# Patient Record
Sex: MALE | Race: WHITE | NOT HISPANIC OR LATINO | Employment: FULL TIME | URBAN - METROPOLITAN AREA
[De-identification: names, ages, dates, MRNs, and addresses within clinical notes are randomized per-mention and may not be internally consistent; named-entity substitution may affect disease eponyms.]

---

## 2017-06-30 ENCOUNTER — HOSPITAL ENCOUNTER (OUTPATIENT)
Dept: RADIOLOGY | Facility: HOSPITAL | Age: 62
Discharge: HOME | End: 2017-06-30
Attending: FAMILY MEDICINE
Payer: COMMERCIAL

## 2017-06-30 ENCOUNTER — TRANSCRIBE ORDERS (OUTPATIENT)
Dept: ADMINISTRATIVE | Facility: HOSPITAL | Age: 62
End: 2017-06-30

## 2017-06-30 DIAGNOSIS — M54.2 CERVICALGIA: ICD-10-CM

## 2017-06-30 DIAGNOSIS — R55 SYNCOPE AND COLLAPSE: ICD-10-CM

## 2017-06-30 DIAGNOSIS — M54.2 CERVICALGIA: Primary | ICD-10-CM

## 2017-06-30 PROCEDURE — 72050 X-RAY EXAM NECK SPINE 4/5VWS: CPT

## 2017-08-12 ENCOUNTER — HOSPITAL ENCOUNTER (OUTPATIENT)
Dept: RADIOLOGY | Facility: HOSPITAL | Age: 62
Discharge: HOME | End: 2017-08-12
Payer: COMMERCIAL

## 2017-08-12 ENCOUNTER — TRANSCRIBE ORDERS (OUTPATIENT)
Dept: ADMINISTRATIVE | Facility: HOSPITAL | Age: 62
End: 2017-08-12

## 2017-08-12 DIAGNOSIS — T15.90XA EYE FOREIGN BODIES, UNSPECIFIED LATERALITY, INITIAL ENCOUNTER: ICD-10-CM

## 2017-08-12 DIAGNOSIS — T15.90XA EYE FOREIGN BODIES, UNSPECIFIED LATERALITY, INITIAL ENCOUNTER: Primary | ICD-10-CM

## 2017-08-12 PROCEDURE — 70030 X-RAY EYE FOR FOREIGN BODY: CPT

## 2017-09-21 ENCOUNTER — TRANSCRIBE ORDERS (OUTPATIENT)
Dept: ADMINISTRATIVE | Facility: HOSPITAL | Age: 62
End: 2017-09-21

## 2017-09-21 DIAGNOSIS — I77.1 STRICTURE OF ARTERY (HCC): Primary | ICD-10-CM

## 2017-09-28 ENCOUNTER — HOSPITAL ENCOUNTER (OUTPATIENT)
Dept: CT IMAGING | Facility: HOSPITAL | Age: 62
Discharge: HOME | End: 2017-09-28
Payer: COMMERCIAL

## 2017-09-28 DIAGNOSIS — I77.1 STRICTURE OF ARTERY (HCC): ICD-10-CM

## 2017-09-28 PROCEDURE — 70496 CT ANGIOGRAPHY HEAD: CPT

## 2017-09-28 PROCEDURE — 70498 CT ANGIOGRAPHY NECK: CPT

## 2017-09-28 RX ADMIN — IOHEXOL 85 ML: 350 INJECTION, SOLUTION INTRAVENOUS at 19:31

## 2017-10-31 ENCOUNTER — TRANSCRIBE ORDERS (OUTPATIENT)
Dept: ADMINISTRATIVE | Facility: HOSPITAL | Age: 62
End: 2017-10-31

## 2017-10-31 DIAGNOSIS — G72.9 CERVICAL MYOPATHY: Primary | ICD-10-CM

## 2017-11-06 ENCOUNTER — HOSPITAL ENCOUNTER (OUTPATIENT)
Dept: MRI IMAGING | Facility: HOSPITAL | Age: 62
Discharge: HOME | End: 2017-11-06
Payer: COMMERCIAL

## 2017-11-06 DIAGNOSIS — G72.9 CERVICAL MYOPATHY: ICD-10-CM

## 2017-11-06 PROCEDURE — 72141 MRI NECK SPINE W/O DYE: CPT

## 2017-11-29 ENCOUNTER — GENERIC CONVERSION - ENCOUNTER (OUTPATIENT)
Dept: OTHER | Facility: OTHER | Age: 62
End: 2017-11-29

## 2017-12-07 ENCOUNTER — GENERIC CONVERSION - ENCOUNTER (OUTPATIENT)
Dept: OTHER | Facility: OTHER | Age: 62
End: 2017-12-07

## 2018-01-03 ENCOUNTER — ALLSCRIPTS OFFICE VISIT (OUTPATIENT)
Dept: OTHER | Facility: OTHER | Age: 63
End: 2018-01-03

## 2018-01-04 NOTE — CONSULTS
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; Order Comments:patricia cerebral; [facility][id number][facility]; For:Vertebrobasilar artery syndrome; Ordered By:Romero Santos;    Discussion/Summary      This is a 42-year-old gentleman who is being evaluated for positional symptoms  Specifically when he lays down turned his head he will often become dizzy or pass out  There is concern for vertebrobasilar insufficiency and we were asked to perform an arteriogram  On the CTA he does have bilateral codominant Vertebral arteries  I discussed the diagnosis of [place] Nixon syndrome with the patient  Given the presence of 2 codominant versus I find it unlikely  However given his positional nature of his symptoms dynamic imaging may aid in further diagnosis  discussed the risks and benefits of a diagnostic cerebral arteriogram including bleeding, stroke, vessel injury, and kidney injury  We discussed that he would need to be flat for 45 minutes  In addition we will have to turn his head during the procedure to do dynamic studies  Should he have symptoms at this time he will not be able to sit up he will have to turn to his side slowly  He is in understanding of this and is willing to participate  Hopefully this will help and the diagnosis or treatment of this disease  this study be negative for vascular compromise I would recommend Cardiology consult for possible Holter monitor or an ENT consult  you very much for allowing me to participate in the care of Mr Parker Rios  I spent 60 minutes in the care of the patient 50% of which was spent in counseling and care coordination        Chief Complaint   Patient presents for initial consult regarding brain abnormality      History of Present Illness   Abnormalities have been found on head CT and brain CT angiography  Symptoms:  nausea, but-- no headache,-- no seizure,-- no ataxia,-- no urinary incontinence,-- no neurologic deficit,-- no personality changes,-- no cognitive impairment-- and-- no change in mental status--       The patient presents with complaints of vertigo (with episode)  The patient presents with complaints of syncope (he had one episode where he felt the tunnel vision and he was out for 40 minutes but unsure of event)      The patient presents with complaints of near syncope (These episodes start with spinning sensation followed by tunnel vision in both eyes and then almost blacking out  at first this only affected him when laying on his back but now it also happens when laying on his right side greater than left)      The patient presents with complaints of visual disturbance (with episode)    Mr Hugo Camacho was referred for evaluation by Dr Frances Persons  He is a pleasant 69-year-old gentleman who was employed as a  and  who noticed several years ago that when he laid down he began to have significant vertiginous symptoms and would pass out  He had 1 significant episode left him unconscious for approximately 40 minutes  He states that these do not occur when he is sitting up even with his head turned  It occurs solely when he is lying down and mostly when he lays down and turned his head towards the right  Occasionally all happened while looking left  He states that the onset is very rapid and needs to dizziness, a feeling that the room is spinning, and dark as that comes over his eyes  He does have associated nausea with this  He is extraordinarily anxious about this recurring  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  He has no difficulty while driving or sitting  CTA was completed as well as an MRI of his cervical spine which was personally reviewed  He appears to have codominant vertebral arteries in normal carotid arteries   I do not see any evidence of stenosis or vascular compromise on static imaging  However due to the reproducible positional nature of his symptoms there is concerned of possible occlusive disease in various positions  As such she was referred for evaluation angiogram      His review of systems is otherwise negative  His past medical history is significant for hypertension, diabetes, hyperlipidemia  His past surgical history is significant for the trigger hand  His family history significant for diabetes and stroke  He is currently   He has 2 children  He is employed as a  and   He quit smoking more than 10 years ago and only smoked pipe  He drinks 1 alcoholic drink a day  He is allergic to Lipitor  He takes Bystolic, metoprolol, metformin, glipizide, and baby aspirin  His imaging was personally reviewed  Review of Systems        Constitutional: No fever or chills, feels well, no tiredness, no recent weight gain or weight loss  Eyes: No complaints of eye pain, no red eyes, no discharge from eyes, no itchy eyes  ENT: no complaints of earache, no hearing loss, no nosebleeds, no nasal discharge, no sore throat, no hoarseness  Cardiovascular: No complaints of slow heart rate, no fast heart rate, no chest pain, no palpitations, no leg claudication, no lower extremity  Respiratory: No complaints of shortness of breath, no wheezing, no cough, no SOB on exertion, no orthopnea or PND  Gastrointestinal: No complaints of abdominal pain, no constipation, no nausea or vomiting, no diarrhea or bloody stools  Genitourinary: No complaints of dysuria, no incontinence, no hesitancy, no nocturia, no genital lesion, no testicular pain  Musculoskeletal: No complaints of arthralgia, no myalgias, no joint swelling or stiffness, no limb pain or swelling  Integumentary: No complaints of skin rash or skin lesions, no itching, no skin wound, no dry skin        Neurological: as noted in HPI       Psychiatric: Is not suicidal, no sleep disturbances, no anxiety or depression, no change in personality, no emotional problems  Endocrine: No complaints of proptosis, no hot flashes, no muscle weakness, no erectile dysfunction, no deepening of the voice, no feelings of weakness  Hematologic/Lymphatic: No complaints of swollen glands, no swollen glands in the neck, does not bleed easily, no easy bruising  ROS reviewed  Active Problems   1  Chronic Central Serous Retinopathy (362 41)   2  Degenerative arthritis of cervical spine (721 0) (M47 812)   3  Diabetes mellitus type 2, insulin dependent (250 00,V58 67) (E11 9,Z79 4)   4  Diabetic retinopathy (250 50,362 01) (E11 319)   5  Eczema (692 9) (L30 9)   6  Hyperlipidemia (272 4) (E78 5)   7  Hypertension (401 9) (I10)   8  Radiculopathy, cervical (723 4) (M54 12)   9  Special screening for malignant neoplasm of colon (V76 51) (Z12 11)   10  Weakness of both arms (729 89) (R29 898)    Past Medical History   1  Need for prophylactic vaccination and inoculation against influenza (V04 81) (Z23)   2  History of Open Wound Of The Finger With Tendon Involvement (883 2)   3  History of Rupture Of Flexor Digitorum Profundus Of Hand (727 64)     The active problems and past medical history were reviewed and updated today  Surgical History   1  History of Hand Surgery   2  History of Tonsillectomy     The surgical history was reviewed and updated today  Family History   Family History    1  Family history of Diabetes Mellitus (V18 0)   2  Family history of Alzheimer's disease (V17 2) (Z82 0)   3  Family history of heart failure (V17 49) (Z82 49)     The family history was reviewed and updated today         Social History    · Former smoker (E41 11) (F92 102)   · Four children   · High school or GED   ·    · No illicit drug use   · Occupation   · Rarely consumes alcohol (V49 89) (Z78 9)  The social history was reviewed and updated today  The social history was reviewed and is unchanged  Current Meds    1  Aspirin 81 MG TABS; Take 1 tablet daily Recorded   2  Bystolic 5 MG Oral Tablet; TAKE 1 TABLET DAILY; Therapy: (TISKUQYI:83YTH1660) to Recorded   3  Glimepiride 2 MG Oral Tablet; TAKE 1 TABLET DAILY AS DIRECTED; Therapy: (AVIDIHGU:29DTM9897) to Recorded   4  MetFORMIN HCl  MG Oral Tablet Extended Release 24 Hour; Take 1 tablet twice     daily; Therapy: (GIXLRUXD:31ZGL9665) to Recorded   5  Pravastatin Sodium 40 MG Oral Tablet; TAKE 1 TABLET DAILY AT BEDTIME  Requested     for: 07EJX4484; Last Rx:[id number]; Status: ACTIVE - Renewal Denied Ordered   6  Victoza 18 MG/3ML SOLN; INJECT 1 2 MG SUBCUTANEOUSLY EVERY DAY; Therapy: (72 667 213) to Recorded     The medication list was reviewed and updated today  Allergies   1  Lipitor TABS    Vitals   Vital Signs    Recorded: 65SOS4287 03:25PM   Temperature 96 8 F   Heart Rate 74   Respiration 16   Systolic 000   Diastolic 72   Height 5 ft 9 in   Weight 176 lb    BMI Calculated 25 99   BSA Calculated 1 96   Pain Scale 0     Physical Exam    (He is awake alert and oriented  He is in no acute distress  His pupils are equal round reactive to light  His extraocular movements are intact  His face is symmetric  His tongue is midline  Facial sensation intact and symmetric throughout  His shoulder shrug is 5/5  He has no drift  He has no dysmetria  He has full strength in his upper and lower extremities  He has normal muscle tone and muscle bulk  He has 2+ radial pulses  His gait is normal  He has normal S1 and S2 heart sounds  His breath sounds are clear    I was able to slowly laid him down supine onto the examining table  In a neutral position he had no symptoms  I 1st had him turn his head to the left and look upward  He had no symptoms in this position  Next I had him turn his head to the right and look up   Within 10 seconds he became extremely dizzy and set up immediately  He was clearly shaken and Diaphoretic  His symptoms resolved upon sitting up)        Signatures    Electronically signed by : SALVADOR Myrick ; Lve  3 2018  4:47PM EST                       (Author)

## 2018-01-15 DIAGNOSIS — G45.0 VERTEBRO-BASILAR ARTERY SYNDROME: ICD-10-CM

## 2018-01-22 VITALS
RESPIRATION RATE: 16 BRPM | WEIGHT: 176 LBS | SYSTOLIC BLOOD PRESSURE: 148 MMHG | HEART RATE: 74 BPM | TEMPERATURE: 96.8 F | DIASTOLIC BLOOD PRESSURE: 72 MMHG | BODY MASS INDEX: 26.07 KG/M2 | HEIGHT: 69 IN

## 2018-01-23 NOTE — CONSULTS
Chief Complaint  Patient presents for initial consult regarding brain abnormality      History of Present Illness  Abnormalities have been found on head CT and brain CT angiography  Symptoms:  nausea, but no headache, no seizure, no ataxia, no urinary incontinence, no neurologic deficit, no personality changes, no cognitive impairment and no change in mental status    The patient presents with complaints of vertigo (with episode)  The patient presents with complaints of syncope (he had one episode where he felt the tunnel vision and he was out for 40 minutes but unsure of event)   The patient presents with complaints of near syncope (These episodes start with spinning sensation followed by tunnel vision in both eyes and then almost blacking out  at first this only affected him when laying on his back but now it also happens when laying on his right side greater than left)   The patient presents with complaints of visual disturbance (with episode)   Mr Wallace Weir was referred for evaluation by Dr Thomas Check  He is a pleasant 79-year-old gentleman who was employed as a  and  who noticed several years ago that when he laid down he began to have significant vertiginous symptoms and would pass out  He had 1 significant episode left him unconscious for approximately 40 minutes  He states that these do not occur when he is sitting up even with his head turned  It occurs solely when he is lying down and mostly when he lays down and turned his head towards the right  Occasionally all happened while looking left  He states that the onset is very rapid and needs to dizziness, a feeling that the room is spinning, and dark as that comes over his eyes  He does have associated nausea with this  He is extraordinarily anxious about this recurring  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  He has no difficulty while driving or sitting      A CTA was completed as well as an MRI of his cervical spine which was personally reviewed  He appears to have codominant vertebral arteries in normal carotid arteries  I do not see any evidence of stenosis or vascular compromise on static imaging  However due to the reproducible positional nature of his symptoms there is concerned of possible occlusive disease in various positions  As such she was referred for evaluation angiogram      His review of systems is otherwise negative  His past medical history is significant for hypertension, diabetes, hyperlipidemia  His past surgical history is significant for the trigger hand  His family history significant for diabetes and stroke  He is currently   He has 2 children  He is employed as a  and   He quit smoking more than 10 years ago and only smoked pipe  He drinks 1 alcoholic drink a day  He is allergic to Lipitor  He takes Bystolic, metoprolol, metformin, glipizide, and baby aspirin  His imaging was personally reviewed  Review of Systems    Constitutional: No fever or chills, feels well, no tiredness, no recent weight gain or weight loss  Eyes: No complaints of eye pain, no red eyes, no discharge from eyes, no itchy eyes  ENT: no complaints of earache, no hearing loss, no nosebleeds, no nasal discharge, no sore throat, no hoarseness  Cardiovascular: No complaints of slow heart rate, no fast heart rate, no chest pain, no palpitations, no leg claudication, no lower extremity  Respiratory: No complaints of shortness of breath, no wheezing, no cough, no SOB on exertion, no orthopnea or PND  Gastrointestinal: No complaints of abdominal pain, no constipation, no nausea or vomiting, no diarrhea or bloody stools  Genitourinary: No complaints of dysuria, no incontinence, no hesitancy, no nocturia, no genital lesion, no testicular pain  Musculoskeletal: No complaints of arthralgia, no myalgias, no joint swelling or stiffness, no limb pain or swelling     Integumentary: No complaints of skin rash or skin lesions, no itching, no skin wound, no dry skin  Neurological: as noted in HPI  Psychiatric: Is not suicidal, no sleep disturbances, no anxiety or depression, no change in personality, no emotional problems  Endocrine: No complaints of proptosis, no hot flashes, no muscle weakness, no erectile dysfunction, no deepening of the voice, no feelings of weakness  Hematologic/Lymphatic: No complaints of swollen glands, no swollen glands in the neck, does not bleed easily, no easy bruising  ROS reviewed  Active Problems    1  Chronic Central Serous Retinopathy (362 41)   2  Degenerative arthritis of cervical spine (721 0) (M47 812)   3  Diabetes mellitus type 2, insulin dependent (250 00,V58 67) (E11 9,Z79 4)   4  Diabetic retinopathy (250 50,362 01) (E11 319)   5  Eczema (692 9) (L30 9)   6  Hyperlipidemia (272 4) (E78 5)   7  Hypertension (401 9) (I10)   8  Radiculopathy, cervical (723 4) (M54 12)   9  Special screening for malignant neoplasm of colon (V76 51) (Z12 11)   10  Weakness of both arms (729 89) (R29 898)    Past Medical History    · Need for prophylactic vaccination and inoculation against influenza (V04 81) (Z23)   · History of Open Wound Of The Finger With Tendon Involvement (883 2)   · History of Rupture Of Flexor Digitorum Profundus Of Hand (727 64)    The active problems and past medical history were reviewed and updated today  Surgical History    · History of Hand Surgery   · History of Tonsillectomy    The surgical history was reviewed and updated today  Family History    · Family history of Diabetes Mellitus (V18 0)   · Family history of Alzheimer's disease (V17 2) (Z82 0)   · Family history of heart failure (V17 49) (Z82 49)    The family history was reviewed and updated today         Social History    · Former smoker (U74 76) (Z60 770)   · Four children   · High school or GED   ·    · No illicit drug use   · Occupation   · Rarely consumes alcohol (V49 89) (Z78 9)  The social history was reviewed and updated today  The social history was reviewed and is unchanged  Current Meds   1  Aspirin 81 MG TABS; Take 1 tablet daily Recorded   2  Bystolic 5 MG Oral Tablet; TAKE 1 TABLET DAILY; Therapy: (WGNJUSYD:48SPH8137) to Recorded   3  Glimepiride 2 MG Oral Tablet; TAKE 1 TABLET DAILY AS DIRECTED; Therapy: (DQCBMSXX:28BXO9344) to Recorded   4  MetFORMIN HCl  MG Oral Tablet Extended Release 24 Hour; Take 1 tablet twice   daily; Therapy: (UCTMUDWO:72HTH2793) to Recorded   5  Pravastatin Sodium 40 MG Oral Tablet; TAKE 1 TABLET DAILY AT BEDTIME  Requested   for: 13YDF9403; Last Rx:[id number]; Status: ACTIVE - Renewal Denied Ordered   6  Victoza 18 MG/3ML SOLN; INJECT 1 2 MG SUBCUTANEOUSLY EVERY DAY; Therapy: (([phone]) to Recorded    The medication list was reviewed and updated today  Allergies    1  Lipitor TABS    Vitals   Recorded: 25JTJ3430 03:25PM   Temperature 96 8 F   Heart Rate 74   Respiration 16   Systolic 284   Diastolic 72   Height 5 ft 9 in   Weight 176 lb    BMI Calculated 25 99   BSA Calculated 1 96   Pain Scale 0     Physical Exam   (He is awake alert and oriented  He is in no acute distress  His pupils are equal round reactive to light  His extraocular movements are intact  His face is symmetric  His tongue is midline  Facial sensation intact and symmetric throughout  His shoulder shrug is 5/5  He has no drift  He has no dysmetria  He has full strength in his upper and lower extremities  He has normal muscle tone and muscle bulk  He has 2+ radial pulses  His gait is normal  He has normal S1 and S2 heart sounds  His breath sounds are clear    I was able to slowly laid him down supine onto the examining table  In a neutral position he had no symptoms  I 1st had him turn his head to the left and look upward  He had no symptoms in this position  Next I had him turn his head to the right and look up   Within 10 seconds he became extremely dizzy and set up immediately  He was clearly shaken and Diaphoretic  His symptoms resolved upon sitting up)  Assessment    1  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)    Plan  Vertebrobasilar artery syndrome    · IR CEREBRAL ANGIOGRAPHY; Status:Need Information - Financial Authorization; Requested IDM:[id number]; Perform: [facility]; Order Comments:patricia cerebral; WAZ:[id number];PIWYPHS; For:Vertebrobasilar artery syndrome; Ordered By:Romero Santos;    Discussion/Summary    This is a 67-year-old gentleman who is being evaluated for positional symptoms  Specifically when he lays down turned his head he will often become dizzy or pass out  There is concern for vertebrobasilar insufficiency and we were asked to perform an arteriogram  On the CTA he does have bilateral codominant Vertebral arteries  I discussed the diagnosis of [place] syndrome with the patient  Given the presence of 2 codominant versus I find it unlikely  However given his positional nature of his symptoms dynamic imaging may aid in further diagnosis  We discussed the risks and benefits of a diagnostic cerebral arteriogram including bleeding, stroke, vessel injury, and kidney injury  We discussed that he would need to be flat for 45 minutes  In addition we will have to turn his head during the procedure to do dynamic studies  Should he have symptoms at this time he will not be able to sit up he will have to turn to his side slowly  He is in understanding of this and is willing to participate  Hopefully this will help and the diagnosis or treatment of this disease  Should this study be negative for vascular compromise I would recommend Cardiology consult for possible Holter monitor or an ENT consult  Thank you very much for allowing me to participate in the care of Mr Otilia Reyez  I spent 60 minutes in the care of the patient 50% of which was spent in counseling and care coordination  Signatures   Electronically signed by : SALVADOR Bernstein ; Lev  3 2018  4:47PM EST                       (Author)

## 2018-01-27 ENCOUNTER — HOSPITAL ENCOUNTER (EMERGENCY)
Facility: HOSPITAL | Age: 63
Discharge: HOME | End: 2018-01-27
Payer: COMMERCIAL

## 2018-01-27 ENCOUNTER — APPOINTMENT (EMERGENCY)
Dept: RADIOLOGY | Facility: HOSPITAL | Age: 63
End: 2018-01-27
Payer: COMMERCIAL

## 2018-01-27 VITALS
OXYGEN SATURATION: 96 % | HEART RATE: 74 BPM | HEIGHT: 70 IN | DIASTOLIC BLOOD PRESSURE: 84 MMHG | WEIGHT: 180 LBS | SYSTOLIC BLOOD PRESSURE: 143 MMHG | BODY MASS INDEX: 25.77 KG/M2 | TEMPERATURE: 97.5 F | RESPIRATION RATE: 20 BRPM

## 2018-01-27 DIAGNOSIS — S62.638B: Primary | ICD-10-CM

## 2018-01-27 PROCEDURE — 96365 THER/PROPH/DIAG IV INF INIT: CPT

## 2018-01-27 PROCEDURE — 99283 EMERGENCY DEPT VISIT LOW MDM: CPT

## 2018-01-27 PROCEDURE — 73140 X-RAY EXAM OF FINGER(S): CPT

## 2018-01-27 RX ORDER — CEPHALEXIN 250 MG/1
500 CAPSULE ORAL EVERY 8 HOURS SCHEDULED
Qty: 42 CAPSULE | Refills: 0 | Status: SHIPPED | OUTPATIENT
Start: 2018-01-27 | End: 2018-02-03

## 2018-01-27 RX ADMIN — CEFAZOLIN SODIUM 2000 MG: 2 SOLUTION INTRAVENOUS at 17:33

## 2018-01-27 NOTE — ED NOTES
Patient noted soaking left hand, 2nd finger     [facility], [address] [place] [address]  01/27/18 3898

## 2018-01-27 NOTE — DISCHARGE INSTRUCTIONS
Can take over the counter Tylenol or Ibuprofen for pain  Watch for signs of infection (redness, swelling, pain, pus, streaking redness or fever)  Follow up with Orthopedics  Finger Fracture   WHAT YOU NEED TO KNOW:   A finger fracture is a break in 1 or more of the bones in your finger  DISCHARGE INSTRUCTIONS:   Return to the emergency department if:   · Your cast or splint gets wet, damaged, or comes off  · Your splint or cast feels too tight  · You have severe pain  · Your injured finger is numb, cold, or pale  Contact your healthcare provider or hand specialist if:   · Your pain or swelling gets worse, even after treatment  · You have questions or concerns about your condition or care  Medicines:   · NSAIDs , such as ibuprofen, help decrease swelling, pain, and fever  This medicine is available with or without a doctor's order  NSAIDs can cause stomach bleeding or kidney problems in certain people  If you take blood thinner medicine, always ask your healthcare provider if NSAIDs are safe for you  Always read the medicine label and follow directions  · Acetaminophen  decreases pain and fever  It is available without a doctor's order  Ask how much to take and how often to take it  Follow directions  Acetaminophen can cause liver damage if not taken correctly  · Prescription pain medicine  may be given  Ask your healthcare provider how to take this medicine safely  Some prescription pain medicines contain acetaminophen  Do not take other medicines that contain acetaminophen without talking to your healthcare provider  Too much acetaminophen may cause liver damage  Prescription pain medicine may cause constipation  Ask your healthcare provider how to prevent or treat constipation  · Take your medicine as directed  Contact your healthcare provider if you think your medicine is not helping or if you have side effects  Tell him or her if you are allergic to any medicine   Keep a list of the medicines, vitamins, and herbs you take  Include the amounts, and when and why you take them  Bring the list or the pill bottles to follow-up visits  Carry your medicine list with you in case of an emergency  Self-care:   · Wear your splint as directed  Do not remove your splint until you follow up with your healthcare provider or hand specialist      · Apply ice  on your finger for 15 to 20 minutes every hour or as directed  Use an ice pack, or put crushed ice in a plastic bag  Cover it with a towel before you apply it to your skin  Ice helps prevent tissue damage and decreases swelling and pain  · Elevate  your finger above the level of your heart as often as you can  This will help decrease swelling and pain  Prop your hand on pillows or blankets to keep it elevated comfortably  · Go to physical therapy as directed  A physical therapist teaches you exercises to help improve movement and strength, and to decrease pain  Follow up with your healthcare provider or hand specialist within 2 days:  Write down your questions so you remember to ask them during your visits  © 2017 [facility] Information is for End User's use only and may not be sold, redistributed or otherwise used for commercial purposes  All illustrations and images included in CareNotes® are the copyrighted property of A D A M , Inc  or [employer]  The above information is an  only  It is not intended as medical advice for individual conditions or treatments  Talk to your doctor, nurse or pharmacist before following any medical regimen to see if it is safe and effective for you

## 2018-01-27 NOTE — ED NOTES
Non stick dressing and amanda wrap applied to left hand 2nd finger as per PA's verbal order with finger splint     Asher You RN  01/27/18 4859

## 2018-01-27 NOTE — ED PROVIDER NOTES
History  Chief Complaint   Patient presents with    Hand Laceration     Patietn states he had his finger crushed by  a pipe yesterday  Pt states it won't stop bleeeding  Laceration to L index finger  61 yo male presents to the ER with a laceration to the L index finger that occurred yesterday when he was removing pipes that were in his truck and the pipe slipped and crushed his finger  Pt states that he cleaned and put a bandaid to the area but this morning the bandaid was covered in blood and he has changed the bandaid several times  Pt admits to swelling, laceration to pad of finger and throbbing pain  Denies N/T, decreased ROM  Pt states that he is UTD with Tetanus injection  Prior to Admission Medications   Prescriptions Last Dose Informant Patient Reported? Taking? Liraglutide (VICTOZA) 18 MG/3ML SOPN  Self Yes Yes   Sig: Inject 1 2 mg under the skin daily   aspirin 81 MG tablet  Self Yes Yes   Sig: Take 81 mg by mouth daily   glimepiride (AMARYL) 2 mg tablet  Self Yes Yes   Sig: Take 2 mg by mouth daily at bedtime   metFORMIN (GLUMETZA) 1000 MG (MOD) 24 hr tablet  Self Yes Yes   Sig: Take 1,000 mg by mouth 2 (two) times a day with meals   nebivolol (BYSTOLIC) 5 mg tablet  Self Yes Yes   Sig: Take 5 mg by mouth daily   pravastatin (PRAVACHOL) 40 mg tablet  Self Yes Yes   Sig: Take 40 mg by mouth daily      Facility-Administered Medications: None       Past Medical History:   Diagnosis Date    Diabetes mellitus ([facility] [address] )     Hyperlipidemia        Past Surgical History:   Procedure Laterality Date    EYE SURGERY         History reviewed  No pertinent family history  I have reviewed and agree with the history as documented  Social History   Substance Use Topics    Smoking status: Former Smoker     Quit date: 2/2/1995    Smokeless tobacco: Never Used    Alcohol use 0 6 oz/week     1 Cans of beer per week        Review of Systems   Musculoskeletal: Negative for joint swelling     Skin: Positive for wound (laceration to pad of finger with swelling)  All other systems reviewed and are negative  Physical Exam  ED Triage Vitals   Temperature Pulse Respirations Blood Pressure SpO2   01/27/18 1613 01/27/18 1613 01/27/18 1613 01/27/18 1613 01/27/18 1613   97 5 °F (36 4 °C) 76 20 140/82 99 %      Temp src Heart Rate Source Patient Position - Orthostatic VS BP Location FiO2 (%)   -- 01/27/18 1735 01/27/18 1735 01/27/18 1735 --    Monitor Sitting Right arm       Pain Score       01/27/18 1613       3           Orthostatic Vital Signs  Vitals:    01/27/18 1613 01/27/18 1735 01/27/18 1810   BP: 140/82 148/78 143/84   Pulse: 76 71 74   Patient Position - Orthostatic VS:  Sitting Sitting       Physical Exam   Constitutional: He is oriented to person, place, and time  Vital signs are normal  He appears well-developed and well-nourished  HENT:   Head: Normocephalic and atraumatic  Eyes: Conjunctivae and EOM are normal  Pupils are equal, round, and reactive to light  Neck: Normal range of motion  Neck supple  Cardiovascular: Normal rate, regular rhythm and normal heart sounds  Pulmonary/Chest: Effort normal and breath sounds normal    Abdominal: Soft  Bowel sounds are normal    Musculoskeletal: Normal range of motion  He exhibits tenderness (to distal phalanx of L index finger)  Left hand: He exhibits tenderness, laceration and swelling  He exhibits normal range of motion  Normal sensation noted  Normal strength noted  Hands:  Neurological: He is alert and oriented to person, place, and time  Skin: Skin is warm and dry  Laceration (to pad of L index finger approx 0 5 cm) noted  Psychiatric: He has a normal mood and affect  His speech is normal and behavior is normal  Judgment and thought content normal  Cognition and memory are normal    Nursing note and vitals reviewed        ED Medications  Medications   ceFAZolin (ANCEF) IVPB (premix) 2,000 mg (0 mg Intravenous Stopped 1/27/18 200)       Diagnostic Studies  Results Reviewed     None                 XR finger second digit-index LEFT   ED Interpretation by Pedro Mott PA-C (01/27 1646)   Positive fracture to medial tip of phalanx      Final Result by Kvng Albright MD (01/28 1308)      Oblique fracture through the distal tuft of the distal phalanx 2nd digit  Findings are concordant with preliminary report from the ED as documented in Epic  Workstation performed: [id number]                    Procedures  Procedures       Phone Contacts  ED Phone Contact    ED Course  ED Course      Discussed with pt that due to fracture of phalanx it is an opened fracture and he should have IV antibiotics now and a prescription for antibiotics to prevent further infection  Pt states that he understands and agrees with treatment plan  MDM  Number of Diagnoses or Management Options  Open fracture of distal phalanx of index finger: new and requires workup     Amount and/or Complexity of Data Reviewed  Tests in the radiology section of CPT®: ordered and reviewed  Independent visualization of images, tracings, or specimens: yes    Patient Progress  Patient progress: stable    CritCare Time    Disposition  Final diagnoses:   Open fracture of distal phalanx of index finger     Time reflects when diagnosis was documented in both MDM as applicable and the Disposition within this note     Time User Action Codes Description Comment    1/27/2018  6:22 PM Yudy Montenegro Add [A59 652K] Open fracture of distal phalanx of index finger       ED Disposition     ED Disposition Condition Comment    Discharge  Erica Key discharge to home/self care      Condition at discharge: Stable        Follow-up Information     Follow up With Specialties Details Why [address] [facility] DOCTORS [facility] Schedule an appointment as soon as possible for a visit in 2 days For [place] [phone] [facility] [phone]  [address] [place] [address] Family Medicine Schedule an appointment as soon as possible for a visit in 2 days For [address]  [address] [facility]  [address] [facility]          Discharge Medication List as of 1/27/2018  6:25 PM      START taking these medications    Details   cephalexin (KEFLEX) 250 mg capsule Take 2 capsules (500 mg total) by mouth every 8 (eight) hours for 7 days, Starting Sat 1/27/2018, Until Sat 2/3/2018, Normal         CONTINUE these medications which have NOT CHANGED    Details   aspirin 81 MG tablet Take 81 mg by mouth daily, Historical Med      glimepiride (AMARYL) 2 mg tablet Take 2 mg by mouth daily at bedtime, Historical Med      Liraglutide (VICTOZA) 18 MG/3ML SOPN Inject 1 2 mg under the skin daily, Historical Med      metFORMIN (GLUMETZA) 1000 MG (MOD) 24 hr tablet Take 1,000 mg by mouth 2 (two) times a day with meals, Historical Med      nebivolol (BYSTOLIC) 5 mg tablet Take 5 mg by mouth daily, Historical Med      pravastatin (PRAVACHOL) 40 mg tablet Take 40 mg by mouth daily, Historical Med           No discharge procedures on file      ED Provider  Electronically Signed by           Melissa Rodriguez PA-C  02/08/18 712 Juaquin Wu PA-C  02/08/18 Emma Roldan

## 2018-01-29 ENCOUNTER — TELEPHONE (OUTPATIENT)
Dept: RADIOLOGY | Facility: HOSPITAL | Age: 63
End: 2018-01-29

## 2018-01-29 ENCOUNTER — TRANSCRIBE ORDERS (OUTPATIENT)
Dept: ADMINISTRATIVE | Facility: HOSPITAL | Age: 63
End: 2018-01-29

## 2018-01-29 ENCOUNTER — APPOINTMENT (OUTPATIENT)
Dept: LAB | Facility: HOSPITAL | Age: 63
End: 2018-01-29
Attending: NEUROLOGICAL SURGERY
Payer: COMMERCIAL

## 2018-01-29 ENCOUNTER — HOSPITAL ENCOUNTER (OUTPATIENT)
Dept: NON INVASIVE DIAGNOSTICS | Facility: HOSPITAL | Age: 63
Discharge: HOME | End: 2018-01-29
Attending: NEUROLOGICAL SURGERY
Payer: COMMERCIAL

## 2018-01-29 DIAGNOSIS — G45.0 BASILAR ARTERY SYNDROME: Primary | ICD-10-CM

## 2018-01-29 DIAGNOSIS — G45.0 BASILAR ARTERY SYNDROME: ICD-10-CM

## 2018-01-29 DIAGNOSIS — G45.0 VERTEBRO-BASILAR ARTERY SYNDROME: ICD-10-CM

## 2018-01-29 LAB
ANION GAP SERPL CALCULATED.3IONS-SCNC: 8 MMOL/L (ref 4–13)
APTT PPP: 28 SECONDS (ref 23–35)
ATRIAL RATE: 64 BPM
BASOPHILS # BLD AUTO: 0.04 THOUSANDS/ΜL (ref 0–0.1)
BASOPHILS NFR BLD AUTO: 1 % (ref 0–1)
BUN SERPL-MCNC: 19 MG/DL (ref 5–25)
CALCIUM SERPL-MCNC: 9.5 MG/DL (ref 8.3–10.1)
CHLORIDE SERPL-SCNC: 105 MMOL/L (ref 100–108)
CO2 SERPL-SCNC: 27 MMOL/L (ref 21–32)
CREAT SERPL-MCNC: 1.07 MG/DL (ref 0.6–1.3)
EOSINOPHIL # BLD AUTO: 0.18 THOUSAND/ΜL (ref 0–0.61)
EOSINOPHIL NFR BLD AUTO: 3 % (ref 0–6)
ERYTHROCYTE [DISTWIDTH] IN BLOOD BY AUTOMATED COUNT: 13.7 % (ref 11.6–15.1)
EST. AVERAGE GLUCOSE BLD GHB EST-MCNC: 186 MG/DL
GFR SERPL CREATININE-BSD FRML MDRD: 73 ML/MIN/1.73SQ M
GLUCOSE P FAST SERPL-MCNC: 106 MG/DL (ref 65–99)
HBA1C MFR BLD: 8.1 % (ref 4.2–6.3)
HCT VFR BLD AUTO: 43.9 % (ref 36.5–49.3)
HGB BLD-MCNC: 14.4 G/DL (ref 12–17)
INR PPP: 0.95 (ref 0.86–1.16)
LYMPHOCYTES # BLD AUTO: 2.12 THOUSANDS/ΜL (ref 0.6–4.47)
LYMPHOCYTES NFR BLD AUTO: 30 % (ref 14–44)
MCH RBC QN AUTO: 27.6 PG (ref 26.8–34.3)
MCHC RBC AUTO-ENTMCNC: 32.8 G/DL (ref 31.4–37.4)
MCV RBC AUTO: 84 FL (ref 82–98)
MONOCYTES # BLD AUTO: 0.46 THOUSAND/ΜL (ref 0.17–1.22)
MONOCYTES NFR BLD AUTO: 6 % (ref 4–12)
NEUTROPHILS # BLD AUTO: 4.39 THOUSANDS/ΜL (ref 1.85–7.62)
NEUTS SEG NFR BLD AUTO: 60 % (ref 43–75)
P AXIS: 61 DEGREES
PLATELET # BLD AUTO: 197 THOUSANDS/UL (ref 149–390)
PMV BLD AUTO: 11.7 FL (ref 8.9–12.7)
POTASSIUM SERPL-SCNC: 4.6 MMOL/L (ref 3.5–5.3)
PR INTERVAL: 154 MS
PROTHROMBIN TIME: 12.5 SECONDS (ref 12.1–14.4)
QRS AXIS: 71 DEGREES
QRSD INTERVAL: 136 MS
QT INTERVAL: 428 MS
QTC INTERVAL: 441 MS
RBC # BLD AUTO: 5.21 MILLION/UL (ref 3.88–5.62)
SODIUM SERPL-SCNC: 140 MMOL/L (ref 136–145)
T WAVE AXIS: 31 DEGREES
VENTRICULAR RATE: 64 BPM
WBC # BLD AUTO: 7.19 THOUSAND/UL (ref 4.31–10.16)

## 2018-01-29 PROCEDURE — 85610 PROTHROMBIN TIME: CPT

## 2018-01-29 PROCEDURE — 85730 THROMBOPLASTIN TIME PARTIAL: CPT

## 2018-01-29 PROCEDURE — 80048 BASIC METABOLIC PNL TOTAL CA: CPT

## 2018-01-29 PROCEDURE — 93010 ELECTROCARDIOGRAM REPORT: CPT | Performed by: INTERNAL MEDICINE

## 2018-01-29 PROCEDURE — 93005 ELECTROCARDIOGRAM TRACING: CPT

## 2018-01-29 PROCEDURE — 36415 COLL VENOUS BLD VENIPUNCTURE: CPT

## 2018-01-29 PROCEDURE — 83036 HEMOGLOBIN GLYCOSYLATED A1C: CPT

## 2018-01-29 PROCEDURE — 85025 COMPLETE CBC W/AUTO DIFF WBC: CPT

## 2018-01-29 RX ORDER — SODIUM CHLORIDE 9 MG/ML
75 INJECTION, SOLUTION INTRAVENOUS CONTINUOUS
Status: CANCELLED | OUTPATIENT
Start: 2018-01-29

## 2018-02-01 ENCOUNTER — TELEPHONE (OUTPATIENT)
Dept: INPATIENT UNIT | Facility: HOSPITAL | Age: 63
End: 2018-02-01

## 2018-02-02 ENCOUNTER — HOSPITAL ENCOUNTER (OUTPATIENT)
Dept: RADIOLOGY | Facility: HOSPITAL | Age: 63
Discharge: HOME | End: 2018-02-02
Attending: NEUROLOGICAL SURGERY | Admitting: NEUROLOGICAL SURGERY
Payer: COMMERCIAL

## 2018-02-02 ENCOUNTER — ANESTHESIA EVENT (OUTPATIENT)
Dept: SURGERY | Facility: HOSPITAL | Age: 63
End: 2018-02-02
Payer: COMMERCIAL

## 2018-02-02 ENCOUNTER — ANESTHESIA (OUTPATIENT)
Dept: SURGERY | Facility: HOSPITAL | Age: 63
End: 2018-02-02
Payer: COMMERCIAL

## 2018-02-02 VITALS
DIASTOLIC BLOOD PRESSURE: 78 MMHG | TEMPERATURE: 97.6 F | OXYGEN SATURATION: 94 % | RESPIRATION RATE: 18 BRPM | HEART RATE: 73 BPM | SYSTOLIC BLOOD PRESSURE: 124 MMHG | HEIGHT: 70 IN | BODY MASS INDEX: 25.77 KG/M2 | WEIGHT: 180 LBS

## 2018-02-02 DIAGNOSIS — G45.0 VERTEBRO-BASILAR ARTERY SYNDROME: ICD-10-CM

## 2018-02-02 LAB
ANION GAP SERPL CALCULATED.3IONS-SCNC: 8 MMOL/L (ref 4–13)
BUN SERPL-MCNC: 28 MG/DL (ref 5–25)
CALCIUM SERPL-MCNC: 9.8 MG/DL (ref 8.3–10.1)
CHLORIDE SERPL-SCNC: 105 MMOL/L (ref 100–108)
CO2 SERPL-SCNC: 25 MMOL/L (ref 21–32)
CREAT SERPL-MCNC: 1.2 MG/DL (ref 0.6–1.3)
ERYTHROCYTE [DISTWIDTH] IN BLOOD BY AUTOMATED COUNT: 13.2 % (ref 11.6–15.1)
GFR SERPL CREATININE-BSD FRML MDRD: 64 ML/MIN/1.73SQ M
GLUCOSE P FAST SERPL-MCNC: 163 MG/DL (ref 65–99)
GLUCOSE SERPL-MCNC: 145 MG/DL (ref 65–140)
GLUCOSE SERPL-MCNC: 163 MG/DL (ref 65–140)
HCT VFR BLD AUTO: 44.4 % (ref 36.5–49.3)
HGB BLD-MCNC: 15.1 G/DL (ref 12–17)
INR PPP: 0.99 (ref 0.86–1.16)
MCH RBC QN AUTO: 28.5 PG (ref 26.8–34.3)
MCHC RBC AUTO-ENTMCNC: 34 G/DL (ref 31.4–37.4)
MCV RBC AUTO: 84 FL (ref 82–98)
PLATELET # BLD AUTO: 217 THOUSANDS/UL (ref 149–390)
PMV BLD AUTO: 11.7 FL (ref 8.9–12.7)
POTASSIUM SERPL-SCNC: 4.4 MMOL/L (ref 3.5–5.3)
PROTHROMBIN TIME: 13.1 SECONDS (ref 12.1–14.4)
RBC # BLD AUTO: 5.29 MILLION/UL (ref 3.88–5.62)
SODIUM SERPL-SCNC: 138 MMOL/L (ref 136–145)
WBC # BLD AUTO: 7.68 THOUSAND/UL (ref 4.31–10.16)

## 2018-02-02 PROCEDURE — 36224 PLACE CATH CAROTD ART: CPT | Performed by: NEUROLOGICAL SURGERY

## 2018-02-02 PROCEDURE — 80048 BASIC METABOLIC PNL TOTAL CA: CPT | Performed by: NEUROLOGICAL SURGERY

## 2018-02-02 PROCEDURE — 36226 PLACE CATH VERTEBRAL ART: CPT

## 2018-02-02 PROCEDURE — C1894 INTRO/SHEATH, NON-LASER: HCPCS

## 2018-02-02 PROCEDURE — 36224 PLACE CATH CAROTD ART: CPT

## 2018-02-02 PROCEDURE — 36226 PLACE CATH VERTEBRAL ART: CPT | Performed by: NEUROLOGICAL SURGERY

## 2018-02-02 PROCEDURE — 85610 PROTHROMBIN TIME: CPT | Performed by: NEUROLOGICAL SURGERY

## 2018-02-02 PROCEDURE — C1769 GUIDE WIRE: HCPCS

## 2018-02-02 PROCEDURE — 82948 REAGENT STRIP/BLOOD GLUCOSE: CPT

## 2018-02-02 PROCEDURE — 85027 COMPLETE CBC AUTOMATED: CPT | Performed by: NEUROLOGICAL SURGERY

## 2018-02-02 RX ORDER — SODIUM CHLORIDE 9 MG/ML
125 INJECTION, SOLUTION INTRAVENOUS CONTINUOUS
Status: DISCONTINUED | OUTPATIENT
Start: 2018-02-02 | End: 2018-02-02 | Stop reason: HOSPADM

## 2018-02-02 RX ORDER — FENTANYL CITRATE 50 UG/ML
INJECTION, SOLUTION INTRAMUSCULAR; INTRAVENOUS AS NEEDED
Status: DISCONTINUED | OUTPATIENT
Start: 2018-02-02 | End: 2018-02-02 | Stop reason: SURG

## 2018-02-02 RX ORDER — ONDANSETRON 2 MG/ML
INJECTION INTRAMUSCULAR; INTRAVENOUS AS NEEDED
Status: DISCONTINUED | OUTPATIENT
Start: 2018-02-02 | End: 2018-02-02 | Stop reason: SURG

## 2018-02-02 RX ORDER — PROPOFOL 10 MG/ML
INJECTION, EMULSION INTRAVENOUS AS NEEDED
Status: DISCONTINUED | OUTPATIENT
Start: 2018-02-02 | End: 2018-02-02 | Stop reason: SURG

## 2018-02-02 RX ORDER — SODIUM CHLORIDE 9 MG/ML
75 INJECTION, SOLUTION INTRAVENOUS CONTINUOUS
Status: DISCONTINUED | OUTPATIENT
Start: 2018-02-02 | End: 2018-02-02 | Stop reason: SDUPTHER

## 2018-02-02 RX ORDER — LIDOCAINE HYDROCHLORIDE 10 MG/ML
INJECTION, SOLUTION EPIDURAL; INFILTRATION; INTRACAUDAL; PERINEURAL AS NEEDED
Status: DISCONTINUED | OUTPATIENT
Start: 2018-02-02 | End: 2018-02-02 | Stop reason: SURG

## 2018-02-02 RX ORDER — MIDAZOLAM HYDROCHLORIDE 1 MG/ML
INJECTION INTRAMUSCULAR; INTRAVENOUS AS NEEDED
Status: DISCONTINUED | OUTPATIENT
Start: 2018-02-02 | End: 2018-02-02 | Stop reason: SURG

## 2018-02-02 RX ADMIN — IODIXANOL 65 ML: 320 INJECTION, SOLUTION INTRAVASCULAR at 12:21

## 2018-02-02 RX ADMIN — PROPOFOL 75 MG: 10 INJECTION, EMULSION INTRAVENOUS at 09:59

## 2018-02-02 RX ADMIN — LIDOCAINE HYDROCHLORIDE 25 MG: 10 INJECTION, SOLUTION EPIDURAL; INFILTRATION; INTRACAUDAL; PERINEURAL at 09:52

## 2018-02-02 RX ADMIN — FENTANYL CITRATE 25 MCG: 50 INJECTION, SOLUTION INTRAMUSCULAR; INTRAVENOUS at 09:48

## 2018-02-02 RX ADMIN — SODIUM CHLORIDE 75 ML/HR: 0.9 INJECTION, SOLUTION INTRAVENOUS at 08:46

## 2018-02-02 RX ADMIN — MIDAZOLAM HYDROCHLORIDE 2 MG: 1 INJECTION, SOLUTION INTRAMUSCULAR; INTRAVENOUS at 09:35

## 2018-02-02 RX ADMIN — ONDANSETRON 4 MG: 2 INJECTION INTRAMUSCULAR; INTRAVENOUS at 10:39

## 2018-02-02 RX ADMIN — SODIUM CHLORIDE: 0.9 INJECTION, SOLUTION INTRAVENOUS at 09:35

## 2018-02-02 NOTE — DISCHARGE INSTRUCTIONS
ARTERIOGRAM    WHAT YOU SHOULD KNOW:   An angiogram is a procedure to look at arteries in your body  Arteries are the blood vessels that carry blood from your heart to your body  AFTER YOU LEAVE:     Self-care:   · Limit activity: Rest for the remainder of the day of your procedure  Have some one with you until the next morning  Keep your arm or leg straight as much as possible  Rest as much as possible, sitting lying or reclining  Walk only to go to the bathroom, to bed or to eat  If the angiogram catheter was put in your leg, use the stairs as little as possible  No driving  · Keep your wound clean and dry  You may shower 24 hours after your procedure  The bandage you have on should fall off in 2-3 days  If there is any drainage from the puncture site, you should put on a clean bandage  · Watch for bleeding and bruising: It is normal to have a bruise and soreness where the angiogram catheter went in  · Diet:   · You may resume your regular diet, Sips of flat soda will help with mild nausea  · Drink more liquids than usual for the next 24 hours      · IMMEDIATELY Contact Interventional Radiology at [phone] Mike PATIENTS: Contact Interventional Radiology at [phone]) Kiet Hendrickson PATIENTS: Contact Interventional Radiology at [phone]) if any of the following occur:  · If your bruise gets larger or if you notice any active bleeding  APPLY DIRECT PRESSURE TO THE BLEEDING SITE  · If you notice increased swelling or have increased pain at the puncture site   · If you have any numbness or pain in the extremity of the puncture site   · If that extremity seems cold or pale      · You have fever greater than 101  · Persistent nausea or vomitting    Follow up with your primary healthcare provider  as directed: Write down your questions so you remember to ask them during your visits

## 2018-02-02 NOTE — ANESTHESIA POSTPROCEDURE EVALUATION
Post-Op Assessment Note      CV Status:  Stable    Mental Status:  Alert and awake    Hydration Status:  Euvolemic    PONV Controlled:  Controlled    Airway Patency:  Patent    Post Op Vitals Reviewed: Yes          Staff: CRNA       Comments: Patient resting comfortably, VSS          BP   138/77   Temp      Pulse  82   Resp   14   SpO2   96% on RA

## 2018-02-02 NOTE — ANESTHESIA PREPROCEDURE EVALUATION
Review of Systems/Medical History  Patient summary reviewed  Chart reviewed  No history of anesthetic complications     Cardiovascular  Exercise tolerance: good,  Hyperlipidemia,    Pulmonary  Negative pulmonary ROS No sleep apnea ,        GI/Hepatic  Negative GI/hepatic ROS          Negative  ROS        Endo/Other  Diabetes () type 2 Oral agent,      GYN       Hematology  Negative hematology ROS      Musculoskeletal  Negative musculoskeletal ROS        Neurology      Comment: Vertebrobasilar insufficiency sxs when pt lies supine, chidi with head turned to right - for dx angiogram today Psychology   Negative psychology ROS              Physical Exam    Airway    Mallampati score: II  TM Distance: >3 FB  Neck ROM: full     Dental       Cardiovascular      Pulmonary      Other Findings       Lab Results   Component Value Date    WBC 7 68 02/02/2018    HGB 15 1 02/02/2018     02/02/2018     Lab Results   Component Value Date     02/02/2018    K 4 4 02/02/2018    BUN 28 (H) 02/02/2018    CREATININE 1 20 02/02/2018    GLUCOSE 163 (H) 02/02/2018     Lab Results   Component Value Date    PTT 28 01/29/2018      Lab Results   Component Value Date    INR 0 99 02/02/2018     Lab Results   Component Value Date    HGBA1C 8 1 (H) 01/29/2018         Anesthesia Plan  ASA Score- 2     Anesthesia Type- IV sedation with anesthesia with ASA Monitors  Additional Monitors:   Airway Plan:         Plan Factors-    Induction- intravenous  Postoperative Plan-     Informed Consent- Anesthetic plan and risks discussed with patient, daughter and spouse  I personally reviewed this patient with the CRNA  Discussed and agreed on the Anesthesia Plan with the CRNA  Seth Granite

## 2018-02-02 NOTE — OP NOTE
OPERATIVE REPORT  PATIENT NAME: Ene Garcia     :  1955  MRN: [id number]   Pt Location: Interventional radiology    SURGERY DATE: 18     Preop Diagnosis:  1  Rule out Bowhunters syndrome  2  Vertigo    Postop Diagnosis  1  Rule out Bowhunters syndrome  2  Vertigo    Procedure:  Right Common Carotid Arteriogram  Right Internal Carotid Arteriogram  Left Common Carotid Arteriogram  Left Internal Carotid Arteriogram  Right Vertebral Artery Arteriogram  Left Vertebral Artery Arteriogram  Limited Right Femoral Arteriogram    Surgeon:   Anabel Trinidad MD    Specimen(s):  None    Estimated Blood Loss:   None    Drains:  None    Anesthesia Type:   Monitored Anesthesia Care     Complications:  None    Operative Indications:  Ene Garcia  is a very pleasant 60 y o  male who presents with vertiginous symptoms in syncope or presyncope when turning his head  His medical workup has been negative and there was concern for possible bow Nixon syndrome  His noninvasive imaging revealed bilateral vertebral arteries, however, despite the low likelihood of this disease given his recurrent symptoms without explanation we discussed the risks and benefits of cerebral arteriography  After discussing the risks and benefits of the procedure including bleeding, stroke, groin hematoma, and death he elected to go ahead and proceed  Procedure Details:  After obtaining written informed consent, the patient was brought into the operating room and moved to the OR table in supine fashion  The groin was prepped and draped in the usual sterile fashion  Monitored anesthesia care was induced  10 cc of intradermal lidocaine was infused into the right femoral area and percutaneous access with a 5-Botswanan micropuncture kit was obtained into the right femoral artery  A 5-Botswanan introducer sheath was placed and a 5-Botswanan angled glide catheter was then advanced into the aorta, and over the aortic arch over a Glidewire       The right common carotid artery was then catheterized  AP lateral and oblique images of the right cervical carotid were obtained  The catheter was then advanced and the right internal carotid artery was then catheterized  AP lateral and magnified oblique images of the right intracranial carotid circulation were obtained  The catheter was then withdrawn into the aortic arch and advanced into the left common carotid artery  AP lateral and oblique images of the left cervical carotid were obtained  The catheter was then advanced and the left internal carotid artery was then catheterized  AP lateral and magnified oblique images of the left intracranial carotid circulation were obtained  The catheter was then withdrawn into the brachiocephalic artery and advanced into  the right vertebral artery  Transfascial lateral and oblique images of the right vertebral artery intracranial circulation were obtained  Following this, dynamic images with his head turned up into the left and then up and turned to the right were obtained  The catheter was then withdrawn into the aortic arch and advanced into the left vertebral artery  Transfascial lateral and oblique images of the left vertebral artery intracranial circulation were obtained  Following this, dynamic images with his head turned up into the left and then up and turned to the right were obtained  The catheter was then withdrawn from the body and a limited right femoral arteriogram was run  Puncture site was found to be compatible with a Mynx Closure device and this was done successfully  There was appropriate hemostasis  The patient was then awoken from his monitored anesthesia care and found to be in his neurologic baseline  All sponge and needle counts were correct  INTERPRETATION OF ANGIOGRAPHIC FINDINGS:   1 The Right common carotid circulation reveals antegrade flow into the internal and external carotid arteries   There is no hemodynamically significant carotid stenosis as defined by NASCET criteria  2  The Right internal carotid artery circulation reveals antegrade flow into the middle cerebral and anterior cerebral arteries  There is a patent anterior communicating artery  There is a patent posterior communicating artery  There is no evidence of aneurysm, AVM, or vascular malformation  The capillary and venous phases are unremarkable  3  The Left common carotid circulation reveals antegrade flow into the internal and external carotid arteries  There is no hemodynamically significant carotid stenosis as defined by NASCET criteria  4  The Left internal carotid artery circulation reveals antegrade flow into the middle cerebral and anterior cerebral arteries  There is a patent anterior communicating artery  There is a patent posterior communicating artery  There is no evidence of aneurysm, AVM, or vascular malformation  The capillary and venous phases are unremarkable  5  The Right vertebral intracranial circulation reveals retrograde reflux down the contralateral vertebral artery  Both PICAs are   well visualized  Antegrade flow is present into all the posterior circulation branches  There are no AVMs or aneurysms  The capillary and venous phases are unremarkable  With his head turned both upwards into the left and then upwards to the right there does not appear to be any flow limitation  Additionally there is reflux again into the contralateral vertebral artery  There is no evidence of bow Nixon syndrome or vertebrobasilar insufficiency  6   The left vertebral artery appears dominant  The left vertebral intracranial circulation reveals retrograde reflux down the contralateral vertebral artery  Both PICAs are well visualized  Antegrade flow is present into all the posterior circulation branches  There are no AVMs or aneurysms  The capillary and venous phases are unremarkable    With his head turned both upwards and the left and then upwards to the right there does not appear to be any flow limitation  Additionally there is reflux again into the contralateral vertebral artery  There is no evidence of bow Nixon syndrome or vertebrobasilar insufficiency  Limited Right femoral arteriogram reveals normal puncture site anatomy  Impression:  Normal arteriogram   No evidence of bow Nixon syndrome or vertebrobasilar insufficiency  No evidence of carotid disease      Patient Disposition:  PACU     SIGNATURE: Migue Kennedy MD  DATE: 02/02/18

## 2018-02-21 ENCOUNTER — OFFICE VISIT (OUTPATIENT)
Dept: NEUROSURGERY | Facility: CLINIC | Age: 63
End: 2018-02-21
Payer: COMMERCIAL

## 2018-02-21 VITALS
BODY MASS INDEX: 26.54 KG/M2 | HEIGHT: 70 IN | DIASTOLIC BLOOD PRESSURE: 72 MMHG | WEIGHT: 185.4 LBS | TEMPERATURE: 97.8 F | SYSTOLIC BLOOD PRESSURE: 130 MMHG

## 2018-02-21 DIAGNOSIS — R42 VERTIGO: Primary | ICD-10-CM

## 2018-02-21 PROCEDURE — 99214 OFFICE O/P EST MOD 30 MIN: CPT | Performed by: NEUROLOGICAL SURGERY

## 2018-02-21 NOTE — PROGRESS NOTES
Patient Id: Dean Muir is a 60 y o  male        Assessment/Plan:    Diagnoses and all orders for this visit:    Vertigo      Discussion Summary: This 76-year-old gentleman who was being evaluated for positional symptoms  He was seen by his neurologist and there was a concern for bow Nixon syndrome/vertebrobasilar insufficiency  As such he was referred for evaluation of angiography  An angiogram was done on February 2nd which revealed normal vertebrobasilar circulation without any positional changes  In addition his extracranial and intracranial carotid circulation was normal     We reviewed his imaging together and I explained that this was not due to his vertebrobasilar circulation or intracranial circulation  He was seen by a cardiologist who also believes that this is likely related to vertigo  He is being referred for physical therapy  The only other recommended evaluation from my standpoint would be an ENT consult should he fail to improve  I spent 25 minutes the patient greater than 50 percent of which was spent in counseling  Chief Complaint: Follow-up (2 wk f/u s/p angio)      HPI:   This 76-year-old gentleman employed as a  and  he several years ago began to have positional symptoms while laying down  He would become vertiginous and pass out  He had 1 significant episode of unconsciousness which lasted approximately 40 minutes  It does not occur when he is sitting up in turning his head  After CTA and MRI was performed of his cervical spine he was referred for evaluation for possible vertebrobasilar insufficiency  He recently underwent a diagnostic cerebral arteriogram       Review of Systems   Constitutional: Negative for activity change, appetite change, chills, diaphoresis, fatigue, fever and unexpected weight change  HENT: Negative  Eyes: Negative  Respiratory: Negative  Cardiovascular: Negative  Gastrointestinal: Negative      Endocrine: Negative  Genitourinary: Negative  Musculoskeletal: Negative  Skin: Negative  Allergic/Immunologic: Negative  Neurological: Positive for dizziness, weakness and numbness (left hip)  Negative for tremors, seizures, syncope, facial asymmetry, speech difficulty, light-headedness and headaches  Hematological: Negative  Psychiatric/Behavioral: Negative  Physical Exam  He is awake alert and oriented  He is in no acute distress  His pupils are equal round and reactive to light  His extraocular movements are intact  His face is symmetric  His tongue is midline  Facial sensation is intact and symmetric throughout  Shoulder shrug is 5/5  He has no drift  He has no dysmetria  He has full strength in his upper and lower extremities  He has normal muscle tone muscle bulk  The following portions of the patient's history were reviewed and updated as appropriate: allergies, current medications, past family history, past medical history, past social history, past surgical history and problem list     Active Ambulatory Problems     Diagnosis Date Noted    Vertigo 02/21/2018     Resolved Ambulatory Problems     Diagnosis Date Noted    No Resolved Ambulatory Problems     Past Medical History:   Diagnosis Date    Diabetes mellitus ([facility] [address] )     Hyperlipidemia        Past Surgical History:   Procedure Laterality Date    EYE SURGERY         Vitals:    02/21/18 0906   BP: 130/72   Temp: 97 8 °F (36 6 °C)         Results/Data:  His angiogram was personally reviewed with him

## 2018-02-21 NOTE — LETTER
February 21, 2018     Roosvelt Bones, [address] [address] [address] [facility] [address])  [facility] [address]    Patient: Sarah Rivera   YOB: 1955   Date of Visit: 2/21/2018       Dear Dr Mi Scott: Thank you for referring Clint Miguel to me for evaluation  Below are my notes for this consultation  If you have questions, please do not hesitate to call me  I look forward to following your patient along with you  Sincerely,        Meenu Ramirez MD        CC: No Recipients  Meenu Ramirez MD  2/21/2018  9:34 AM  Sign at close encounter  Patient Id: Sarah Rivera is a 60 y o  male        Assessment/Plan:    Diagnoses and all orders for this visit:    Vertigo      Discussion Summary: This 18-year-old gentleman who was being evaluated for positional symptoms  He was seen by his neurologist and there was a concern for bow Nixon syndrome/vertebrobasilar insufficiency  As such he was referred for evaluation of angiography  An angiogram was done on February 2nd which revealed normal vertebrobasilar circulation without any positional changes  In addition his extracranial and intracranial carotid circulation was normal     We reviewed his imaging together and I explained that this was not due to his vertebrobasilar circulation or intracranial circulation  He was seen by a cardiologist who also believes that this is likely related to vertigo  He is being referred for physical therapy  The only other recommended evaluation from my standpoint would be an ENT consult should he fail to improve  I spent 25 minutes the patient greater than 50 percent of which was spent in counseling  Chief Complaint: Follow-up (2 wk f/u s/p angio)      HPI:   This 18-year-old gentleman employed as a  and  he several years ago began to have positional symptoms while laying down  He would become vertiginous and pass out    He had 1 significant episode of unconsciousness which lasted approximately 40 minutes  It does not occur when he is sitting up in turning his head  After CTA and MRI was performed of his cervical spine he was referred for evaluation for possible vertebrobasilar insufficiency  He recently underwent a diagnostic cerebral arteriogram       Review of Systems   Constitutional: Negative for activity change, appetite change, chills, diaphoresis, fatigue, fever and unexpected weight change  HENT: Negative  Eyes: Negative  Respiratory: Negative  Cardiovascular: Negative  Gastrointestinal: Negative  Endocrine: Negative  Genitourinary: Negative  Musculoskeletal: Negative  Skin: Negative  Allergic/Immunologic: Negative  Neurological: Positive for dizziness, weakness and numbness (left hip)  Negative for tremors, seizures, syncope, facial asymmetry, speech difficulty, light-headedness and headaches  Hematological: Negative  Psychiatric/Behavioral: Negative  Physical Exam  He is awake alert and oriented  He is in no acute distress  His pupils are equal round and reactive to light  His extraocular movements are intact  His face is symmetric  His tongue is midline  Facial sensation is intact and symmetric throughout  Shoulder shrug is 5/5  He has no drift  He has no dysmetria  He has full strength in his upper and lower extremities  He has normal muscle tone muscle bulk      The following portions of the patient's history were reviewed and updated as appropriate: allergies, current medications, past family history, past medical history, past social history, past surgical history and problem list     Active Ambulatory Problems     Diagnosis Date Noted    Vertigo 02/21/2018     Resolved Ambulatory Problems     Diagnosis Date Noted    No Resolved Ambulatory Problems     Past Medical History:   Diagnosis Date    Diabetes mellitus ([address] )     Hyperlipidemia        Past Surgical History:   Procedure Laterality Date    EYE SURGERY Vitals:    02/21/18 0906   BP: 130/72   Temp: 97 8 °F (36 6 °C)         Results/Data:  His angiogram was personally reviewed with him

## 2019-07-17 ENCOUNTER — TRANSCRIBE ORDERS (OUTPATIENT)
Dept: ADMINISTRATIVE | Facility: HOSPITAL | Age: 64
End: 2019-07-17

## 2019-07-17 ENCOUNTER — HOSPITAL ENCOUNTER (OUTPATIENT)
Dept: RADIOLOGY | Facility: HOSPITAL | Age: 64
Discharge: HOME | End: 2019-07-17
Attending: FAMILY MEDICINE
Payer: COMMERCIAL

## 2019-07-17 DIAGNOSIS — M79.675 PAIN IN TOE OF LEFT FOOT: ICD-10-CM

## 2019-07-17 DIAGNOSIS — M79.675 PAIN IN TOE OF LEFT FOOT: Primary | ICD-10-CM

## 2019-07-17 PROCEDURE — 73660 X-RAY EXAM OF TOE(S): CPT

## 2020-06-15 ENCOUNTER — TELEPHONE (OUTPATIENT)
Dept: GASTROENTEROLOGY | Facility: CLINIC | Age: 65
End: 2020-06-15

## 2021-04-19 LAB
CREAT ?TM UR-SCNC: 70.5 UMOL/L
EXT MICROALBUMIN URINE RANDOM: 13.1
HBA1C MFR BLD HPLC: 8.3 %
MICROALBUMIN/CREAT UR: 19 MG/G{CREAT}

## 2021-04-26 ENCOUNTER — OFFICE VISIT (OUTPATIENT)
Dept: GASTROENTEROLOGY | Facility: CLINIC | Age: 66
End: 2021-04-26
Payer: COMMERCIAL

## 2021-04-26 VITALS
BODY MASS INDEX: 26.48 KG/M2 | SYSTOLIC BLOOD PRESSURE: 162 MMHG | DIASTOLIC BLOOD PRESSURE: 82 MMHG | HEART RATE: 67 BPM | HEIGHT: 70 IN | WEIGHT: 185 LBS

## 2021-04-26 DIAGNOSIS — R13.19 ESOPHAGEAL DYSPHAGIA: Primary | ICD-10-CM

## 2021-04-26 DIAGNOSIS — R10.13 EPIGASTRIC PAIN: ICD-10-CM

## 2021-04-26 DIAGNOSIS — Z12.11 SCREENING FOR COLON CANCER: ICD-10-CM

## 2021-04-26 PROCEDURE — 99204 OFFICE O/P NEW MOD 45 MIN: CPT | Performed by: INTERNAL MEDICINE

## 2021-04-26 RX ORDER — OMEPRAZOLE 40 MG/1
40 CAPSULE, DELAYED RELEASE ORAL DAILY
Qty: 30 CAPSULE | Refills: 3 | Status: SHIPPED | OUTPATIENT
Start: 2021-04-26 | End: 2021-07-18 | Stop reason: SDUPTHER

## 2021-04-26 NOTE — TELEPHONE ENCOUNTER
Why does your doctor want you to have this procedure? Screening; Dysphagia    Do you have kidney disease?  no  If yes, are you on dialysis :     Have you had diverticulitis within the past 2 months? no    Are you diabetic?  yes  If yes, insulin dependent: NIDDM   If yes, provide diabetic instructions sheet     Do take iron supplements?  no  If yes, instruct patient to hold iron supplement for 7 days prior    Are you on a blood thinner? no   Was the blood thinner sheet complete and faxed to cardiologist no  Plavix (clopidogrel), Coumadin (warfarin), Lovenox (enoxaparin), Xarelto (rivaroxaban), Pradaxa(dabigatran), Eliquis(apixaban) Savaysa/Lixiana (edoxapan)    Do you have an automatic implantable cardiac defibrillator (AICD)/pacemaker ([facility])? no  Was AICD/pacemaker sheet completed and faxed to cardiologist? no    Are you on home oxygen? no  If yes, continuous or nocturnal:     Have you been treated for MRSA, VRE or any communicable diseases? no    Heart attack, stroke, or stent within 3 months? no  Schedule at Hospital if within 3-6 months   Use nitroglycerin for chest pain in the last 6 months? no    History of organ  transplant?  no   If yes, notify Endo      History of neck/throat/tongue surgery or cancer? no  IF yes, notify Endo      Any problems with anesthesia in the past? no     Was stool C diff ordered?  no Stool specimen needs to be completed prior to procedure    Do have any facial or body piercings?no     Do you have a latex allergy? no     Do have an allergy to metals? (Bravo study only) no     If pediatric patient, was consent faxed to pediatrician no     Patient rights reviewed yes     Combo prep completed; miralax instructions reviewed and provided to pt

## 2021-04-26 NOTE — H&P (VIEW-ONLY)
[facility] - Outpatient Consultation  Cayetano Beaver 76 y o  male MRN: [id number]  Encounter: [id number]    ASSESSMENT AND PLAN:      1  Esophageal dysphagia  2  Epigastric pain  Dysphagia and epigastric pain is likely due to peptic esophagitis and peptic stricture  Although he does not have classic reflux this would be the most common cause  Differential diagnosis also includes neoplasm at the EG junction and motility disorder such as achalasia but these are less likely  · Reflux diet and precautions  · Cut and chew food well, take time eating would encourage soft foods for now  · Encourage plenty of fluids  · Start omeprazole 40 mg daily  · Schedule EGD at [facility])  - omeprazole (PriLOSEC) 40 MG capsule; Take 1 capsule (40 mg total) by mouth daily  Dispense: 30 capsule; Refill: 3    3  Screening for colon cancer  Average risk  Negative screening colonoscopy over 10 years ago  Due for screening  · Schedule screening colonoscopy and [facility])      Followup Appointment:  About 2 months pending EGD  ______________________________________________________________________    Chief Complaint   Patient presents with    Dysphagia     referrd by Kae Chen Due for colonoscopy       HPI:   Cayetano Beaver is a 76y o  year old male who presents with dysphagia and epigastric pain  Epigastric/lower chest pain  Ongoing for the past year  Getting worse  No change in appetite or weight  No abdominal pain, nausea or vomiting  No heartburn or water brash  Eating smaller bites  No acid reducing meds  On ASA for preventive use, no NSAID's  Stools normal   No melena or heme  Also over 10 years since last colonoscopy  Due for screening      Historical Information   Past Medical History:   Diagnosis Date    Cataract     Cervical radiculopathy     Diabetes mellitus ([address] )     Hemorrhoids     grade 1    Hyperlipidemia     Hypertension     Macular degeneration     Vitamin D insufficiency      Past Surgical History:   Procedure Laterality Date    CATARACT EXTRACTION      COLONOSCOPY      May 2010:  Normal colonoscopy except for small internal hemorrhoids biopsy negative for microscopic colitis    EYE SURGERY      TONSILLECTOMY      UPPER GASTROINTESTINAL ENDOSCOPY      May 2011:  Irregular Z-line but otherwise normal endoscopy biopsies suggestive of Schrader's esophagus, negative for H pylori, no celiac  Social History     Substance and Sexual Activity   Alcohol Use Yes    Alcohol/week: 1 0 standard drinks    Types: 1 Cans of beer per week    Frequency: Monthly or less     Social History     Substance and Sexual Activity   Drug Use No     Social History     Tobacco Use   Smoking Status Former Smoker    Quit date: 1995    Years since quittin 2   Smokeless Tobacco Never Used     Family History   Problem Relation Age of Onset    Heart disease Father     Colon polyps Neg Hx     Colon cancer Neg Hx        Meds/Allergies     Current Outpatient Medications:     aspirin 81 MG tablet    glimepiride (AMARYL) 2 mg tablet    Liraglutide (VICTOZA) 18 MG/3ML SOPN    metFORMIN (GLUMETZA) 1000 MG (MOD) 24 hr tablet    nebivolol (BYSTOLIC) 5 mg tablet    pravastatin (PRAVACHOL) 40 mg tablet    omeprazole (PriLOSEC) 40 MG capsule    Allergies   Allergen Reactions    Iodine Solution [Povidone Iodine] Other (See Comments)     Eye drops; burning sensation in eye    Lipitor [Atorvastatin]        PHYSICAL EXAM:    Blood pressure 162/82, pulse 67, height 5' 10" (1 778 m), weight 83 9 kg (185 lb)  Body mass index is 26 54 kg/m²  General Appearance: NAD, cooperative, alert  Eyes: Anicteric, PERRLA, EOMI  ENT:  Normocephalic, atraumatic, normal mucosa  Neck:  Supple, symmetrical, trachea midline,   Resp:  Clear to auscultation bilaterally; no rales, rhonchi or wheezing; respirations unlabored   CV:  S1 S2, Regular rate and rhythm; no murmur, rub, or gallop    GI:  Soft, epigastric tenderness without rebound or guarding, non-distended; normal bowel sounds; no masses, no organomegaly   Rectal: Deferred  Musculoskeletal: No cyanosis, clubbing or edema  Normal ROM  Skin:  No jaundice, rashes, or lesions   Heme/Lymph: No palpable cervical lymphadenopathy  Psych: Normal affect, good eye contact  Neuro: No gross deficits, AAOx3    Lab Results:   Lab Results   Component Value Date    WBC 7 68 02/02/2018    HGB 15 1 02/02/2018    HCT 44 4 02/02/2018    MCV 84 02/02/2018     02/02/2018     Lab Results   Component Value Date    K 4 4 02/02/2018     02/02/2018    CO2 25 02/02/2018    BUN 28 (H) 02/02/2018    CREATININE 1 20 02/02/2018    GLUF 163 (H) 02/02/2018    CALCIUM 9 8 02/02/2018    EGFR 64 02/02/2018     No results found for: IRON, TIBC, FERRITIN  No results found for: LIPASE    Radiology Results:   No results found  REVIEW OF SYSTEMS:    CONSTITUTIONAL: Denies any fever, chills, rigors, and weight loss  HEENT: No earache or tinnitus  Denies hearing loss or visual disturbances  CARDIOVASCULAR: No chest pain or palpitations  RESPIRATORY: Denies any cough, hemoptysis, shortness of breath or dyspnea on exertion  GASTROINTESTINAL: As noted in the History of Present Illness  GENITOURINARY: No problems with urination  Denies any hematuria or dysuria  NEUROLOGIC: No dizziness or vertigo, denies headaches  MUSCULOSKELETAL: Denies any muscle or joint pain  SKIN: Denies skin rashes or itching  ENDOCRINE: Denies excessive thirst  Denies intolerance to heat or cold  PSYCHOSOCIAL: Denies depression or anxiety  Denies any recent memory loss

## 2021-04-26 NOTE — LETTER
April 26, 2021     Alison Veloz, DO  [address] [facility] [address]    Patient: Nohemy Chisholm   YOB: 1955   Date of Visit: 4/26/2021       Dear Dr Caryle Cozier:    Thank you for referring Jose A Gil to me for evaluation  Below are my notes for this consultation  If you have questions, please do not hesitate to call me  I look forward to following your patient along with you  Sincerely,        Nubia Cabral MD        CC: No Recipients  Nubia Cabral MD  4/26/2021  9:33 AM  Sign when Signing Visit    [address] [facility] - [facility]patient [facility]ation  Nohemy Chisholm 76 y o  male MRN: [id number]  Encounter: [id number]    ASSESSMENT AND PLAN:      1  Esophageal dysphagia  2  Epigastric pain  Dysphagia and epigastric pain is likely due to peptic esophagitis and peptic stricture  Although he does not have classic reflux this would be the most common cause  Differential diagnosis also includes neoplasm at the EG junction and motility disorder such as achalasia but these are less likely  · Reflux diet and precautions  · Cut and chew food well, take time eating would encourage soft foods for now  · Encourage plenty of fluids  · Start omeprazole 40 mg daily  · Schedule EGD at [facility])  - omeprazole (PriLOSEC) 40 MG capsule; Take 1 capsule (40 mg total) by mouth daily  Dispense: 30 capsule; Refill: 3    3  Screening for colon cancer  Average risk  Negative screening colonoscopy over 10 years ago  Due for screening  · Schedule screening colonoscopy and [facility])      Followup Appointment:  About 2 months pending EGD  ______________________________________________________________________    Chief Complaint   Patient presents with    Dysphagia     referrd by Suha Tsai Due for colonoscopy       HPI:   Nohemy Chisholm is a 76y o  year old male who presents with dysphagia and epigastric pain  Epigastric/lower chest pain  Ongoing for the past year    Getting worse   No change in appetite or weight  No abdominal pain, nausea or vomiting  No heartburn or water brash  Eating smaller bites  No acid reducing meds  On ASA for preventive use, no NSAID's  Stools normal   No melena or heme  Also over 10 years since last colonoscopy  Due for screening  Historical Information   Past Medical History:   Diagnosis Date    Cataract     Cervical radiculopathy     Diabetes mellitus ([facility] [address] )     Hemorrhoids     grade 1    Hyperlipidemia     Hypertension     Macular degeneration     Vitamin D insufficiency      Past Surgical History:   Procedure Laterality Date    CATARACT EXTRACTION      COLONOSCOPY      May 2010:  Normal colonoscopy except for small internal hemorrhoids biopsy negative for microscopic colitis    EYE SURGERY      TONSILLECTOMY      UPPER GASTROINTESTINAL ENDOSCOPY      May 2011:  Irregular Z-line but otherwise normal endoscopy biopsies suggestive of Schrader's esophagus, negative for H pylori, no celiac        Social History     Substance and Sexual Activity   Alcohol Use Yes    Alcohol/week: 1 0 standard drinks    Types: 1 Cans of beer per week    Frequency: Monthly or less     Social History     Substance and Sexual Activity   Drug Use No     Social History     Tobacco Use   Smoking Status Former Smoker    Quit date: 1995    Years since quittin 2   Smokeless Tobacco Never Used     Family History   Problem Relation Age of Onset    Heart disease Father     Colon polyps Neg Hx     Colon cancer Neg Hx        Meds/Allergies     Current Outpatient Medications:     aspirin 81 MG tablet    glimepiride (AMARYL) 2 mg tablet    Liraglutide (VICTOZA) 18 MG/3ML SOPN    metFORMIN (GLUMETZA) 1000 MG (MOD) 24 hr tablet    nebivolol (BYSTOLIC) 5 mg tablet    pravastatin (PRAVACHOL) 40 mg tablet    omeprazole (PriLOSEC) 40 MG capsule    Allergies   Allergen Reactions    Iodine Solution [Povidone Iodine] Other (See Comments)     Eye drops; burning sensation in eye    Lipitor [Atorvastatin]        PHYSICAL EXAM:    Blood pressure 162/82, pulse 67, height 5' 10" (1 778 m), weight 83 9 kg (185 lb)  Body mass index is 26 54 kg/m²  General Appearance: NAD, cooperative, alert  Eyes: Anicteric, PERRLA, EOMI  ENT:  Normocephalic, atraumatic, normal mucosa  Neck:  Supple, symmetrical, trachea midline,   Resp:  Clear to auscultation bilaterally; no rales, rhonchi or wheezing; respirations unlabored   CV:  S1 S2, Regular rate and rhythm; no murmur, rub, or gallop  GI:  Soft, epigastric tenderness without rebound or guarding, non-distended; normal bowel sounds; no masses, no organomegaly   Rectal: Deferred  Musculoskeletal: No cyanosis, clubbing or edema  Normal ROM  Skin:  No jaundice, rashes, or lesions   Heme/Lymph: No palpable cervical lymphadenopathy  Psych: Normal affect, good eye contact  Neuro: No gross deficits, AAOx3    Lab Results:   Lab Results   Component Value Date    WBC 7 68 02/02/2018    HGB 15 1 02/02/2018    HCT 44 4 02/02/2018    MCV 84 02/02/2018     02/02/2018     Lab Results   Component Value Date    K 4 4 02/02/2018     02/02/2018    CO2 25 02/02/2018    BUN 28 (H) 02/02/2018    CREATININE 1 20 02/02/2018    GLUF 163 (H) 02/02/2018    CALCIUM 9 8 02/02/2018    EGFR 64 02/02/2018     No results found for: IRON, TIBC, FERRITIN  No results found for: LIPASE    Radiology Results:   No results found  REVIEW OF SYSTEMS:    CONSTITUTIONAL: Denies any fever, chills, rigors, and weight loss  HEENT: No earache or tinnitus  Denies hearing loss or visual disturbances  CARDIOVASCULAR: No chest pain or palpitations  RESPIRATORY: Denies any cough, hemoptysis, shortness of breath or dyspnea on exertion  GASTROINTESTINAL: As noted in the History of Present Illness  GENITOURINARY: No problems with urination  Denies any hematuria or dysuria  NEUROLOGIC: No dizziness or vertigo, denies headaches     MUSCULOSKELETAL: Denies any muscle or joint pain  SKIN: Denies skin rashes or itching  ENDOCRINE: Denies excessive thirst  Denies intolerance to heat or cold  PSYCHOSOCIAL: Denies depression or anxiety  Denies any recent memory loss

## 2021-04-26 NOTE — PROGRESS NOTES
[facility] - Outpatient Consultation  Selena Grimes 76 y o  male MRN: [id number]  Encounter: [id number]    ASSESSMENT AND PLAN:      1  Esophageal dysphagia  2  Epigastric pain  Dysphagia and epigastric pain is likely due to peptic esophagitis and peptic stricture  Although he does not have classic reflux this would be the most common cause  Differential diagnosis also includes neoplasm at the EG junction and motility disorder such as achalasia but these are less likely  · Reflux diet and precautions  · Cut and chew food well, take time eating would encourage soft foods for now  · Encourage plenty of fluids  · Start omeprazole 40 mg daily  · Schedule EGD at [facility])  - omeprazole (PriLOSEC) 40 MG capsule; Take 1 capsule (40 mg total) by mouth daily  Dispense: 30 capsule; Refill: 3    3  Screening for colon cancer  Average risk  Negative screening colonoscopy over 10 years ago  Due for screening  · Schedule screening colonoscopy and [facility])      Followup Appointment:  About 2 months pending EGD  ______________________________________________________________________    Chief Complaint   Patient presents with    Dysphagia     referrd by Elinor Swain Due for colonoscopy       HPI:   Selena Grimes is a 76y o  year old male who presents with dysphagia and epigastric pain  Epigastric/lower chest pain  Ongoing for the past year  Getting worse  No change in appetite or weight  No abdominal pain, nausea or vomiting  No heartburn or water brash  Eating smaller bites  No acid reducing meds  On ASA for preventive use, no NSAID's  Stools normal   No melena or heme  Also over 10 years since last colonoscopy  Due for screening      Historical Information   Past Medical History:   Diagnosis Date    Cataract     Cervical radiculopathy     Diabetes mellitus ([address] )     Hemorrhoids     grade 1    Hyperlipidemia     Hypertension     Macular degeneration     Vitamin D insufficiency      Past Surgical History:   Procedure Laterality Date    CATARACT EXTRACTION      COLONOSCOPY      May 2010:  Normal colonoscopy except for small internal hemorrhoids biopsy negative for microscopic colitis    EYE SURGERY      TONSILLECTOMY      UPPER GASTROINTESTINAL ENDOSCOPY      May 2011:  Irregular Z-line but otherwise normal endoscopy biopsies suggestive of Schrader's esophagus, negative for H pylori, no celiac  Social History     Substance and Sexual Activity   Alcohol Use Yes    Alcohol/week: 1 0 standard drinks    Types: 1 Cans of beer per week    Frequency: Monthly or less     Social History     Substance and Sexual Activity   Drug Use No     Social History     Tobacco Use   Smoking Status Former Smoker    Quit date: 1995    Years since quittin 2   Smokeless Tobacco Never Used     Family History   Problem Relation Age of Onset    Heart disease Father     Colon polyps Neg Hx     Colon cancer Neg Hx        Meds/Allergies     Current Outpatient Medications:     aspirin 81 MG tablet    glimepiride (AMARYL) 2 mg tablet    Liraglutide (VICTOZA) 18 MG/3ML SOPN    metFORMIN (GLUMETZA) 1000 MG (MOD) 24 hr tablet    nebivolol (BYSTOLIC) 5 mg tablet    pravastatin (PRAVACHOL) 40 mg tablet    omeprazole (PriLOSEC) 40 MG capsule    Allergies   Allergen Reactions    Iodine Solution [Povidone Iodine] Other (See Comments)     Eye drops; burning sensation in eye    Lipitor [Atorvastatin]        PHYSICAL EXAM:    Blood pressure 162/82, pulse 67, height 5' 10" (1 778 m), weight 83 9 kg (185 lb)  Body mass index is 26 54 kg/m²  General Appearance: NAD, cooperative, alert  Eyes: Anicteric, PERRLA, EOMI  ENT:  Normocephalic, atraumatic, normal mucosa  Neck:  Supple, symmetrical, trachea midline,   Resp:  Clear to auscultation bilaterally; no rales, rhonchi or wheezing; respirations unlabored   CV:  S1 S2, Regular rate and rhythm; no murmur, rub, or gallop    GI:  Soft, epigastric tenderness without rebound or guarding, non-distended; normal bowel sounds; no masses, no organomegaly   Rectal: Deferred  Musculoskeletal: No cyanosis, clubbing or edema  Normal ROM  Skin:  No jaundice, rashes, or lesions   Heme/Lymph: No palpable cervical lymphadenopathy  Psych: Normal affect, good eye contact  Neuro: No gross deficits, AAOx3    Lab Results:   Lab Results   Component Value Date    WBC 7 68 02/02/2018    HGB 15 1 02/02/2018    HCT 44 4 02/02/2018    MCV 84 02/02/2018     02/02/2018     Lab Results   Component Value Date    K 4 4 02/02/2018     02/02/2018    CO2 25 02/02/2018    BUN 28 (H) 02/02/2018    CREATININE 1 20 02/02/2018    GLUF 163 (H) 02/02/2018    CALCIUM 9 8 02/02/2018    EGFR 64 02/02/2018     No results found for: IRON, TIBC, FERRITIN  No results found for: LIPASE    Radiology Results:   No results found  REVIEW OF SYSTEMS:    CONSTITUTIONAL: Denies any fever, chills, rigors, and weight loss  HEENT: No earache or tinnitus  Denies hearing loss or visual disturbances  CARDIOVASCULAR: No chest pain or palpitations  RESPIRATORY: Denies any cough, hemoptysis, shortness of breath or dyspnea on exertion  GASTROINTESTINAL: As noted in the History of Present Illness  GENITOURINARY: No problems with urination  Denies any hematuria or dysuria  NEUROLOGIC: No dizziness or vertigo, denies headaches  MUSCULOSKELETAL: Denies any muscle or joint pain  SKIN: Denies skin rashes or itching  ENDOCRINE: Denies excessive thirst  Denies intolerance to heat or cold  PSYCHOSOCIAL: Denies depression or anxiety  Denies any recent memory loss

## 2021-05-10 ENCOUNTER — ANESTHESIA EVENT (OUTPATIENT)
Dept: GASTROENTEROLOGY | Facility: AMBULATORY SURGERY CENTER | Age: 66
End: 2021-05-10

## 2021-05-10 ENCOUNTER — ANESTHESIA (OUTPATIENT)
Dept: GASTROENTEROLOGY | Facility: AMBULATORY SURGERY CENTER | Age: 66
End: 2021-05-10

## 2021-05-10 ENCOUNTER — HOSPITAL ENCOUNTER (OUTPATIENT)
Dept: GASTROENTEROLOGY | Facility: AMBULATORY SURGERY CENTER | Age: 66
Discharge: HOME | End: 2021-05-10
Payer: COMMERCIAL

## 2021-05-10 VITALS
SYSTOLIC BLOOD PRESSURE: 122 MMHG | DIASTOLIC BLOOD PRESSURE: 58 MMHG | OXYGEN SATURATION: 96 % | HEART RATE: 70 BPM | RESPIRATION RATE: 15 BRPM | TEMPERATURE: 97.8 F

## 2021-05-10 DIAGNOSIS — Z12.11 SCREENING FOR COLON CANCER: ICD-10-CM

## 2021-05-10 DIAGNOSIS — R13.19 ESOPHAGEAL DYSPHAGIA: ICD-10-CM

## 2021-05-10 DIAGNOSIS — R10.13 EPIGASTRIC PAIN: ICD-10-CM

## 2021-05-10 PROCEDURE — 88305 TISSUE EXAM BY PATHOLOGIST: CPT | Performed by: PATHOLOGY

## 2021-05-10 PROCEDURE — 43450 DILATE ESOPHAGUS 1/MULT PASS: CPT | Performed by: INTERNAL MEDICINE

## 2021-05-10 PROCEDURE — 45380 COLONOSCOPY AND BIOPSY: CPT | Performed by: INTERNAL MEDICINE

## 2021-05-10 PROCEDURE — 43239 EGD BIOPSY SINGLE/MULTIPLE: CPT | Performed by: INTERNAL MEDICINE

## 2021-05-10 RX ORDER — PROPOFOL 10 MG/ML
INJECTION, EMULSION INTRAVENOUS AS NEEDED
Status: DISCONTINUED | OUTPATIENT
Start: 2021-05-10 | End: 2021-05-10

## 2021-05-10 RX ORDER — SODIUM CHLORIDE 9 MG/ML
50 INJECTION, SOLUTION INTRAVENOUS CONTINUOUS
Status: DISCONTINUED | OUTPATIENT
Start: 2021-05-10 | End: 2021-05-10

## 2021-05-10 RX ADMIN — SODIUM CHLORIDE 50 ML/HR: 9 INJECTION, SOLUTION INTRAVENOUS at 10:51

## 2021-05-10 RX ADMIN — PROPOFOL 100 MG: 10 INJECTION, EMULSION INTRAVENOUS at 10:58

## 2021-05-10 RX ADMIN — PROPOFOL 50 MG: 10 INJECTION, EMULSION INTRAVENOUS at 11:08

## 2021-05-10 RX ADMIN — PROPOFOL 50 MG: 10 INJECTION, EMULSION INTRAVENOUS at 11:03

## 2021-05-10 RX ADMIN — PROPOFOL 50 MG: 10 INJECTION, EMULSION INTRAVENOUS at 11:17

## 2021-05-10 NOTE — DISCHARGE INSTRUCTIONS
Schrader Esophagus   WHAT YOU NEED TO KNOW:   What is Schrader esophagus? Schrader esophagus is a condition that is also called intestinal metaplasia  Cells that line your esophagus change into cells that are like intestine cells  The change increases your risk for esophageal cancer  What increases my risk for Schrader esophagus? The exact cause of Schrader esophagus is not known  The following may increase your risk:  · Long-term gastroesophageal reflux disease (GERD)    · Age older than 48    · A family history of GERD, Schrader esophagus, or esophageal cancer    · Obesity    · Smoking cigarettes    What are the signs and symptoms of Schrader esophagus? Signs and symptoms are usually related to the signs and symptoms of GERD  You may have any of the following:  · Heartburn (burning pain in your chest)    · Pain after meals that spreads to your neck, jaw, or shoulder    · Pain that gets better when you change positions    · Bitter or acid taste in your mouth    · A dry cough    · Trouble swallowing or pain with swallowing    · Hoarseness or a sore throat    · Burping or hiccups    · Feeling full soon after you start eating    How is Schrader esophagus diagnosed? An endoscopy is a procedure used to see the inside of your esophagus and stomach  A scope is a long, bendable tube with a light on the end of it  A camera may be hooked to the scope  Your healthcare provider may also take tissue samples to be tested for dysplasia (abnormal changes in your cells and tissues)  If dysplasia is found, it will be given a grade to describe the amount of change  The grade can range from negative (no dysplasia) to high-grade (a large amount)  You have a higher risk for cancer with high-grade dysplasia  How is Schrader esophagus treated? Treatment depends on the grade of dysplasia  The goal of treatment is to control your symptoms and prevent esophageal cancer   Your healthcare provider may also suggest that you make changes in the foods you eat and in your lifestyle  You may need any of the following:  · Anti-reflux medicines  help decrease the stomach acid that can irritate your esophagus and stomach  These medicines may include proton pump inhibitors (PPI) and histamine type-2 receptor (H2) blockers  You may also be given medicines to stop vomiting  · Surgery or other procedures  may be done if you have high-grade dysplasia  Abnormal cells may be killed with heat or by freezing them  Light may be used to kill abnormal cells  It is used in combination with medicines that make the abnormal cells sensitive to light  Surgery may be used to wrap the upper part of your stomach around the esophageal sphincter to strengthen it  Surgery may be needed to remove part or all of your esophagus  What can I do to manage Schrader esophagus? · Do not eat foods that make your symptoms worse  Examples are chocolate, garlic, onions, spicy or fatty foods, citrus fruits (oranges), and tomato-based foods (spaghetti sauce)  Do not drink alcohol, drinks that contain caffeine, or carbonated drinks, such as soda  Ask your healthcare provider if there are other foods and drinks you should not have  · Maintain a healthy weight  If you are overweight, weight loss may help relieve symptoms  Ask your healthcare provider about safe ways to lose weight  · Do not smoke  If you smoke, it is never too late to quit  Smoking may worsen acid reflux  Ask your healthcare provider for information if you need help quitting  Where can I get support and more information? · [address]  [place]  [facility]  Phone: [phone]  Web Address: [email or web address]  [email or web address]    Call your local emergency number (911 in the [address] [place] [address],[address]) if:   · You have severe chest pain and shortness of breath  When should I seek immediate care? · Your bowel movements are black, bloody, or tarry      · Your vomit looks like coffee grounds or has blood in it     When should I call my doctor? · Your symptoms do not improve with treatment  · You have questions or concerns about your condition or care  CARE AGREEMENT:   You have the right to help plan your care  Learn about your health condition and how it may be treated  Discuss treatment options with your healthcare providers to decide what care you want to receive  You always have the right to refuse treatment  The above information is an  only  It is not intended as medical advice for individual conditions or treatments  Talk to your doctor, nurse or pharmacist before following any medical regimen to see if it is safe and effective for you  © Copyright [address] Information is for End User's use only and may not be sold, redistributed or otherwise used for commercial purposes  All illustrations and images included in CareNotes® are the copyrighted property of [employer]  or [facility] Yasmany Cedeno  Allergic Esophagitis   WHAT YOU NEED TO KNOW:   What is allergic esophagitis? Allergic esophagitis is a condition that causes your esophagus to swell and narrow when your body reacts to allergens  An allergen is anything you are allergic to, such as certain foods, dust, or pollen  What increases the risk for allergic esophagitis? Anyone can get allergic esophagitis  The following increase the risk:  · Asthma or seasonal allergies    · Being male    · A family history of allergic esophagitis    What are the signs and symptoms of allergic esophagitis? · Trouble swallowing    · Throat pain during swallowing    · Food stuck in the esophagus    · Heartburn or chest pain    How is allergic esophagitis diagnosed? Your healthcare provider will ask about your symptoms and when they began  Tell him or her if you know certain foods cause your symptoms  Tell him or her if you have any medical conditions or a family history of allergic esophagitis   You may also need any of the following:  · Allergy tests  are used to see how your body reacts to certain allergens  The tests may show what is causing your allergic esophagitis  · A barium swallow x-ray  is used to take pictures inside your esophagus  You will swallow barium in a thick liquid before you have the x-ray  The barium helps any injuries show up better on the x-rays  · Endoscopy  is used to find any tissue changes  A scope is used to see inside the esophagus  A scope is a long, bendable tube with a light on the end  The scope is placed in your mouth and passed down your throat and esophagus  A camera may be hooked to the scope to take pictures  · A biopsy  is used to take tissue samples from your esophagus to be tested  The samples may also be checked for any other problems with your esophagus  How is allergic esophagitis treated? Allergic esophagitis may not go away completely  Treatment may help relieve your symptoms  · Steroid medicine  may help decrease swelling in your esophagus  You will swallow the medicine so it coats your esophagus  · Stomach acid medicine  helps keep heartburn symptoms under control  · Dilatation  is a procedure used when the esophagus narrows from swelling  An endoscope is placed into your mouth and down your throat  Tools on the endoscope press against the tissues to widen your esophagus  Dilatation can improve your symptoms but will not stop allergic esophagitis from happening  What food changes may be needed for allergic esophagitis? You may need to stop eating certain foods for a while to see if your symptoms improve  Start eating these foods again one at a time as directed  If certain foods cause your symptoms, do not eat them  Some common examples are dairy, nuts, eggs, and seafood  You may need to change what you eat to relieve your symptoms  You may need to see a dietitian to help you get the right amount of nutrients    Call your local emergency number (911 in the [address] [facility]) if:   · Food is stuck in your throat  · You have chest pain  When should I seek immediate care? · You vomit blood  · Your bowel movements are black and sticky  · You feel weak or dizzy  When should I call my doctor? · You have a fever  · You have white patches on your tongue and inside your mouth  · You lose weight without trying  · It is hard to swallow or it hurts to swallow, even after treatment  · You have questions or concerns about your condition or care  CARE AGREEMENT:   You have the right to help plan your care  Learn about your health condition and how it may be treated  Discuss treatment options with your healthcare providers to decide what care you want to receive  You always have the right to refuse treatment  The above information is an  only  It is not intended as medical advice for individual conditions or treatments  Talk to your doctor, nurse or pharmacist before following any medical regimen to see if it is safe and effective for you  © Copyright [address] Information is for End User's use only and may not be sold, redistributed or otherwise used for commercial purposes  All illustrations and images included in CareNotes® are the copyrighted property of [employer]  or [facility] Yasmany Dill   Colorectal Polyps   WHAT YOU NEED TO KNOW:   What are colorectal polyps? Colorectal polyps are small growths of tissue in the lining of the colon and rectum  Most polyps are hyperplastic polyps and are usually benign (noncancerous)  Certain types of polyps, called adenomatous polyps, may turn into cancer  What increases my risk of colorectal polyps? The exact cause of colorectal polyps is unknown   The following may increase your risk:  · Older age    · A diet of foods high in fat and low in fiber     · Family history of polyps    · Intestinal diseases, such as Crohn's disease or ulcerative colitis    · An unhealthy lifestyle, such as physical inactivity, smoking, or drinking alcohol    · Obesity    What are the signs and symptoms of colorectal polyps? · Blood in your bowel movement or bleeding from the rectum    · Change in bowel movement habits, such as diarrhea and constipation    · Abdominal pain    How are colorectal polyps diagnosed? You should have fecal blood screening once a year for colorectal disease if you are over 48years old  You should be screened earlier if you have an intestinal disease or a family history of polyps or colorectal cancer  During this screening, a sample of your bowel movement is checked for blood, which may be an early sign of colorectal polyps or cancer  You may also need any of the following tests:  · Digital rectal exam:  Your healthcare provider will examine your anus and use a finger to check your rectum for polyps  · Barium enema: A barium enema is an x-ray of the colon  A tube is put into your anus, and a liquid called barium is put through the tube  Barium is used so that healthcare providers can see your colon better on the x-ray film  · Virtual colonoscopy: This is a CT scan that takes pictures of the inside of your colon and rectum  A small, flexible tube is put into your rectum and air or carbon dioxide (gas) is used to expand your colon  This lets healthcare providers clearly see your colon and any polyps on a monitor  · Colonoscopy or sigmoidoscopy: These procedures help your healthcare provider see the inside of your colon using a flexible tube with a small light and camera on the end  During a sigmoidoscopy, your healthcare provider will only look at rectum and lower colon  During a colonoscopy, healthcare providers will look at the full length of your colon  Healthcare providers may remove a small amount of tissue from the colon for a biopsy  How are colorectal polyps treated? A polypectomy is a minimally invasive procedure to remove your polyps   They may be removed during a colonoscopy or sigmoidoscopy  Your healthcare provider may need to remove the polyps with a laparoscope  Laparoscopy is done by inserting a small, flexible scope into incisions made on your abdomen  What are the risks of colorectal polyps? You may bleed during a colonoscopy procedure  Your bowel may be perforated (torn) when polyps are removed  This may lead to an open abdominal surgery  During surgery, you may bleed too much or get an infection  Adenomatous polyps that are not removed may turn into cancer and become more difficult to treat  Where can I find support and more information? · Akash Dhaliwal ([facility])  [address] [place], [place] [address]  Phone: [phone]  Web Address: Yevette Halsted  [facility] nih gov    When should I contact my healthcare provider? · You have a fever  · You have chills, a cough, or feel weak and achy  · You have abdominal pain that does not go away or gets worse after you take medicine  · Your abdomen is swollen  · You are losing weight without trying  · You have questions or concerns about your condition or care  When should I seek immediate care or call 911? · You have sudden shortness of breath  · You have a fast heart rate, fast breathing, or are too dizzy to stand up  · You have severe abdominal pain  · You see blood in your bowel movement  CARE AGREEMENT:   You have the right to help plan your care  Learn about your health condition and how it may be treated  Discuss treatment options with your healthcare providers to decide what care you want to receive  You always have the right to refuse treatment  The above information is an  only  It is not intended as medical advice for individual conditions or treatments  Talk to your doctor, nurse or pharmacist before following any medical regimen to see if it is safe and effective for you    © [place] [address] [address] Information is for End User's use only and may not be sold, redistributed or otherwise used for commercial purposes  All illustrations and images included in CareNotes® are the copyrighted property of [employer] M , Inc  or Flora Cedeno  Hemorrhoids   WHAT YOU NEED TO KNOW:   What are hemorrhoids? Hemorrhoids are swollen blood vessels inside your rectum (internal hemorrhoids) or on your anus (external hemorrhoids)  Sometimes a hemorrhoid may prolapse  This means it extends out of your anus  What increases my risk for hemorrhoids? · Pregnancy or obesity    · Straining or sitting for a long time during bowel movements    · Liver disease    · Weak muscles around the anus caused by older age, rectal surgery, or anal intercourse    · A lack of physical activity    · Chronic diarrhea or constipation    · A low-fiber diet    What are the signs and symptoms of hemorrhoids? · Pain or itching around your anus or inside your rectum    · Swelling or bumps around your anus    · Bright red blood in your bowel movement, on the toilet paper, or in the toilet bowl    · Tissue bulging out of your anus (prolapsed hemorrhoids)    · Incontinence (poor control over urine or bowel movements)    How are hemorrhoids diagnosed? Your healthcare provider will ask about your symptoms, the foods you eat, and your bowel movements  He or she will examine your anus for external hemorrhoids  You may need the following:  · A digital rectal exam  is a test to check for hemorrhoids  Your healthcare provider will put a gloved finger inside your anus to feel for the hemorrhoids  · An anoscopy  is a test that uses a scope (small tube with a light and camera on the end) to look at your hemorrhoids  How are hemorrhoids treated? Treatment will depend on your symptoms  You may need any of the following:  · Medicines  can help decrease pain and swelling, and soften your bowel movement   The medicine may be a pill, pad, cream, or ointment  · Procedures  may be used to shrink or remove your hemorrhoid  Examples include rubber-band ligation, sclerotherapy, and photocoagulation  These procedures may be done in your healthcare provider's office  Ask your healthcare provider for more information about these procedures  · Surgery  may be needed to shrink or remove your hemorrhoids  How can I manage my symptoms? · Apply ice on your anus for 15 to 20 minutes every hour or as directed  Use an ice pack, or put crushed ice in a plastic bag  Cover it with a towel before you apply it to your anus  Ice helps prevent tissue damage and decreases swelling and pain  · Take a sitz bath  Fill a bathtub with 4 to 6 inches of warm water  You may also use a sitz bath pan that fits inside a toilet bowl  Sit in the sitz bath for 15 minutes  Do this 3 times a day, and after each bowel movement  The warm water can help decrease pain and swelling  · Keep your anal area clean  Gently wash the area with warm water daily  Soap may irritate the area  After a bowel movement, wipe with moist towelettes or wet toilet paper  Dry toilet paper can irritate the area  How can I help prevent hemorrhoids? · Do not strain to have a bowel movement  Do not sit on the toilet too long  These actions can increase pressure on the tissues in your rectum and anus  · Drink plenty of liquids  Liquids can help prevent constipation  Ask how much liquid to drink each day and which liquids are best for you  · Eat a variety of high-fiber foods  Examples include fruits, vegetables, and whole grains  Ask your healthcare provider how much fiber you need each day  You may need to take a fiber supplement  · Exercise as directed  Exercise, such as walking, may make it easier to have a bowel movement  Ask your healthcare provider to help you create an exercise plan  · Do not have anal sex  Anal sex can weaken the skin around your rectum and anus  · Avoid heavy lifting  This can cause straining and increase your risk for another hemorrhoid  When should I seek immediate care? · You have severe pain in your rectum or around your anus  · You have severe pain in your abdomen and you are vomiting  · You have bleeding from your anus that soaks through your underwear  When should I contact my healthcare provider? · You have frequent and painful bowel movements  · Your hemorrhoid looks or feels more swollen than usual      · You do not have a bowel movement for 2 days or more  · You see or feel tissue coming through your anus  · You have questions or concerns about your condition or care  CARE AGREEMENT:   You have the right to help plan your care  Learn about your health condition and how it may be treated  Discuss treatment options with your healthcare providers to decide what care you want to receive  You always have the right to refuse treatment  The above information is an  only  It is not intended as medical advice for individual conditions or treatments  Talk to your doctor, nurse or pharmacist before following any medical regimen to see if it is safe and effective for you  © Copyright [address] Information is for End User's use only and may not be sold, redistributed or otherwise used for commercial purposes  All illustrations and images included in CareNotes® are the copyrighted property of [employer]  or [address] [place] [address]  Upper Endoscopy and Colonoscopy   WHAT YOU NEED TO KNOW:   An upper endoscopy is also called an upper gastrointestinal (GI) endoscopy, or an esophagogastroduodenoscopy (EGD)  It is a procedure to examine the inside of your esophagus, stomach, and duodenum (first part of the small intestine) with a scope  You may feel bloated, gassy, or have some abdominal discomfort after your procedure  Your throat may be sore for 24 to 36 hours   You may burp or pass gas from air that is still inside your body  A colonoscopy is a procedure to examine the inside of your colon (intestine) with a scope  Polyps or tissue growths may have been removed during your colonoscopy  It is normal to feel bloated and to have some abdominal discomfort  You should be passing gas  If you have hemorrhoids or you had polyps removed, you may have a small amount of bleeding  DISCHARGE INSTRUCTIONS:   Seek care immediately if:   · You have sudden, severe abdominal pain  · You have problems swallowing  · You have a large amount of black, sticky bowel movements or blood in your bowel movements  · You have sudden trouble breathing  · You feel weak, lightheaded, or faint or your heart beats faster than normal for you  Contact your healthcare provider if:   · You have a fever and chills  · You have nausea or are vomiting  · Your abdomen is bloated or feels full and hard  · You have abdominal pain  · You have a large amount of black, sticky bowel movements or blood in your bowel movements  · You have not had a bowel movement for 3 days after your procedure  · You have rash or hives  · You have questions or concerns about your procedure  Activity:   ·       Do not lift, strain, or run for 24 hours after your procedure  ·       Rest after your procedure  You have been given medicine to relax you  Do not drive or make important decisions until the day after your procedure  Return to your normal activity as directed  ·       Relieve gas and discomfort from bloating by lying on your right side with a heating pad on your abdomen  You may need to take short walks to help the gas move out  Eat small meals until bloating is relieved  Follow up with your healthcare provider as directed: Write down your questions so you remember to ask them during your visits        If you take a blood thinner, please review the specific instructions from your endoscopist about when you should resume it  These can be found in the Recommendation and Your Medication list sections of this After Visit Summary

## 2021-05-10 NOTE — ANESTHESIA PREPROCEDURE EVALUATION
Procedure:  COLONOSCOPY  EGD    Relevant Problems   CARDIO   (+) Hyperlipidemia   (+) Hypertension      Other   (+) Diabetes mellitus (HCC)        Physical Exam    Airway    Mallampati score: III  TM Distance: >3 FB  Neck ROM: full     Dental   No notable dental hx     Cardiovascular  Rhythm: regular, Rate: normal, Cardiovascular exam normal    Pulmonary  Pulmonary exam normal Breath sounds clear to auscultation,     Other Findings        Anesthesia Plan  ASA Score- 2     Anesthesia Type- IV sedation with anesthesia with ASA Monitors  Additional Monitors:   Airway Plan:           Plan Factors-    Chart reviewed  Patient is not a current smoker  Induction- intravenous  Postoperative Plan-     Informed Consent- Anesthetic plan and risks discussed with patient

## 2021-07-18 DIAGNOSIS — R10.13 EPIGASTRIC PAIN: ICD-10-CM

## 2021-07-18 RX ORDER — OMEPRAZOLE 40 MG/1
CAPSULE, DELAYED RELEASE ORAL
Qty: 90 CAPSULE | Refills: 1 | Status: SHIPPED | OUTPATIENT
Start: 2021-07-18 | End: 2021-08-04 | Stop reason: SINTOL

## 2021-08-04 ENCOUNTER — OFFICE VISIT (OUTPATIENT)
Dept: GASTROENTEROLOGY | Facility: CLINIC | Age: 66
End: 2021-08-04
Payer: COMMERCIAL

## 2021-08-04 VITALS
HEIGHT: 70 IN | SYSTOLIC BLOOD PRESSURE: 160 MMHG | WEIGHT: 182 LBS | DIASTOLIC BLOOD PRESSURE: 78 MMHG | BODY MASS INDEX: 26.05 KG/M2

## 2021-08-04 DIAGNOSIS — K22.70 BARRETT'S ESOPHAGUS WITHOUT DYSPLASIA: ICD-10-CM

## 2021-08-04 DIAGNOSIS — K21.9 GASTROESOPHAGEAL REFLUX DISEASE, UNSPECIFIED WHETHER ESOPHAGITIS PRESENT: Primary | ICD-10-CM

## 2021-08-04 DIAGNOSIS — Z86.010 HISTORY OF COLON POLYPS: ICD-10-CM

## 2021-08-04 DIAGNOSIS — R13.19 ESOPHAGEAL DYSPHAGIA: ICD-10-CM

## 2021-08-04 PROCEDURE — 99213 OFFICE O/P EST LOW 20 MIN: CPT | Performed by: INTERNAL MEDICINE

## 2021-08-04 RX ORDER — FAMOTIDINE 40 MG/1
40 TABLET, FILM COATED ORAL
Qty: 30 TABLET | Refills: 12 | Status: SHIPPED | OUTPATIENT
Start: 2021-08-04 | End: 2022-07-14

## 2021-08-04 NOTE — PATIENT INSTRUCTIONS
Reflux diet and precautions  Cut and chew food well  Take time eating  Try famotidine 40 mg daily Okay to get OTC if prescription is too expensive

## 2021-08-04 NOTE — PROGRESS NOTES
[address] [facility] - Outpatient Follow-up Note  Roman Bustillo 78 y o  male MRN: [id number]  Encounter: [id number]    ASSESSMENT AND PLAN:      1  Gastroesophageal reflux disease, unspecified whether esophagitis present  - famotidine (PEPCID) 40 MG tablet; Take 1 tablet (40 mg total) by mouth daily at bedtime  Dispense: 30 tablet; Refill: 12  2  Esophageal dysphagia  3  Schrader's esophagus without dysplasia  GERD and dysphagia likely secondary to chronic reflux disease  Endoscopy did reveal an irregular Z-line and biopsies were consistent with short-segment Schrader's  Unfortunately the patient did not tolerate the omeprazole recommended  Advised trial of an H2 RA and discussed dietary principles of treating reflux  Explained the significance of Schrader's esophagus with respect to increased cancer risk over the years  · Reflux diet and precautions  · Cut and chew food well, take time eating  · Trial of famotidine 40 mg daily    4  History of colon polyps  Adenomatous polyps identified on recent colonoscopy  Surveillance every 5 years recommended  · Next colonoscopy due May 2026      Followup Appointment:  1 year sooner if needed  ______________________________________________________________________    Chief Complaint   Patient presents with    follow up scope     HPI:  Discussed EGD and colonoscopy results  Schrader's present on EGD  Took omeprazole but states he had some mental status changes on it so discontinued it  Still with some solid food dysphagia  Discussed trial of famotidine  Reinforce reflux diet      Historical Information   Past Medical History:   Diagnosis Date    Cataract     Cervical radiculopathy     Diabetes mellitus ([address] )     Hemorrhoids     grade 1    Hyperlipidemia     Hypertension     Macular degeneration     Vitamin D insufficiency      Past Surgical History:   Procedure Laterality Date    CATARACT EXTRACTION Left     COLONOSCOPY      May :  Normal colonoscopy except for small internal hemorrhoids biopsy negative for microscopic colitis    EYE SURGERY      TONSILLECTOMY      UPPER GASTROINTESTINAL ENDOSCOPY      May 2011:  Irregular Z-line biopsies with inflammation and possible Schrader's esophagus, otherwise normal EGD with biopsies negative for H pylori or celiac  Social History     Substance and Sexual Activity   Alcohol Use Yes    Alcohol/week: 1 0 standard drinks    Types: 1 Cans of beer per week     Social History     Substance and Sexual Activity   Drug Use No     Social History     Tobacco Use   Smoking Status Former Smoker    Quit date: 1995    Years since quittin 5   Smokeless Tobacco Never Used     Family History   Problem Relation Age of Onset    Heart disease Father     Colon polyps Neg Hx     Colon cancer Neg Hx          Current Outpatient Medications:     aspirin 81 MG tablet    glimepiride (AMARYL) 2 mg tablet    Liraglutide (VICTOZA) 18 MG/3ML SOPN    metFORMIN (GLUMETZA) 1000 MG (MOD) 24 hr tablet    nebivolol (BYSTOLIC) 5 mg tablet    pravastatin (PRAVACHOL) 40 mg tablet    famotidine (PEPCID) 40 MG tablet  Allergies   Allergen Reactions    Iodine Solution [Povidone Iodine] Other (See Comments)     Eye drops; burning sensation in eye    Lipitor [Atorvastatin]      Reviewed medications and allergies and updated as indicated    PHYSICAL EXAM:    Blood pressure 160/78, height 5' 10" (1 778 m), weight 82 6 kg (182 lb)  Body mass index is 26 11 kg/m²  General Appearance: NAD, cooperative, alert  Eyes: Anicteric, PERRLA, EOMI  ENT:  Normocephalic, atraumatic, normal mucosa  Neck:  Supple, symmetrical, trachea midline  Resp:  Clear to auscultation bilaterally; no rales, rhonchi or wheezing; respirations unlabored   CV:  S1 S2, Regular rate and rhythm; no murmur, rub, or gallop    GI:  Soft, non-tender, non-distended; normal bowel sounds; no masses, no organomegaly   Rectal: Deferred  Musculoskeletal: No cyanosis, clubbing or edema  Normal ROM  Skin:  No jaundice, rashes, or lesions   Heme/Lymph: No palpable cervical lymphadenopathy  Psych: Normal affect, good eye contact  Neuro: No gross deficits, AAOx3    Lab Results:   Lab Results   Component Value Date    WBC 7 68 02/02/2018    HGB 15 1 02/02/2018    HCT 44 4 02/02/2018    MCV 84 02/02/2018     02/02/2018     Lab Results   Component Value Date    K 4 4 02/02/2018     02/02/2018    CO2 25 02/02/2018    BUN 28 (H) 02/02/2018    CREATININE 1 20 02/02/2018    GLUF 163 (H) 02/02/2018    CALCIUM 9 8 02/02/2018    EGFR 64 02/02/2018     No results found for: IRON, TIBC, FERRITIN  No results found for: LIPASE    Radiology Results:   No results found

## 2021-08-04 NOTE — LETTER
August 4, 2021     Afshan Mercedes DO  [address] [address]    Patient: Pili Laughlin   YOB: 1955   Date of Visit: 8/4/2021       Dear Dr Shree Trimble:    Thank you for referring Katy Larsen to me for evaluation  Below are my notes for this consultation  If you have questions, please do not hesitate to call me  I look forward to following your patient along with you  Sincerely,        Fatmata Hernández MD        CC: No Recipients  Fatmata Hernández MD  8/4/2021  5:44 PM  Sign when Signing Visit  [facility] - Outpatient Follow-up [facility]  Pili Laughlin 78 y o  male MRN: [id number]  Encounter: [id number]    ASSESSMENT AND PLAN:      1  Gastroesophageal reflux disease, unspecified whether esophagitis present  - famotidine (PEPCID) 40 MG tablet; Take 1 tablet (40 mg total) by mouth daily at bedtime  Dispense: 30 tablet; Refill: 12  2  Esophageal dysphagia  3  Schrader's esophagus without dysplasia  GERD and dysphagia likely secondary to chronic reflux disease  Endoscopy did reveal an irregular Z-line and biopsies were consistent with short-segment Schrader's  Unfortunately the patient did not tolerate the omeprazole recommended  Advised trial of an H2 RA and discussed dietary principles of treating reflux  Explained the significance of Schrader's esophagus with respect to increased cancer risk over the years  · Reflux diet and precautions  · Cut and chew food well, take time eating  · Trial of famotidine 40 mg daily    4  History of colon polyps  Adenomatous polyps identified on recent colonoscopy  Surveillance every 5 years recommended  · Next colonoscopy due May 2026      Followup Appointment:  1 year sooner if needed  ______________________________________________________________________    Chief Complaint   Patient presents with    follow up scope     HPI:  Discussed EGD and colonoscopy results  Schrader's present on EGD    Took omeprazole but states he had some mental status changes on it so discontinued it  Still with some solid food dysphagia  Discussed trial of famotidine  Reinforce reflux diet  Historical Information   Past Medical History:   Diagnosis Date    Cataract     Cervical radiculopathy     Diabetes mellitus ([address] )     Hemorrhoids     grade 1    Hyperlipidemia     Hypertension     Macular degeneration     Vitamin D insufficiency      Past Surgical History:   Procedure Laterality Date    CATARACT EXTRACTION Left     COLONOSCOPY      May 2010:  Normal colonoscopy except for small internal hemorrhoids biopsy negative for microscopic colitis    EYE SURGERY      TONSILLECTOMY      UPPER GASTROINTESTINAL ENDOSCOPY      May 2011:  Irregular Z-line biopsies with inflammation and possible Schrader's esophagus, otherwise normal EGD with biopsies negative for H pylori or celiac        Social History     Substance and Sexual Activity   Alcohol Use Yes    Alcohol/week: 1 0 standard drinks    Types: 1 Cans of beer per week     Social History     Substance and Sexual Activity   Drug Use No     Social History     Tobacco Use   Smoking Status Former Smoker    Quit date: 1995    Years since quittin 5   Smokeless Tobacco Never Used     Family History   Problem Relation Age of Onset    Heart disease Father     Colon polyps Neg Hx     Colon cancer Neg Hx          Current Outpatient Medications:     aspirin 81 MG tablet    glimepiride (AMARYL) 2 mg tablet    Liraglutide (VICTOZA) 18 MG/3ML SOPN    metFORMIN (GLUMETZA) 1000 MG (MOD) 24 hr tablet    nebivolol (BYSTOLIC) 5 mg tablet    pravastatin (PRAVACHOL) 40 mg tablet    famotidine (PEPCID) 40 MG tablet  Allergies   Allergen Reactions    Iodine Solution [Povidone Iodine] Other (See Comments)     Eye drops; burning sensation in eye    Lipitor [Atorvastatin]      Reviewed medications and allergies and updated as indicated    PHYSICAL EXAM:    Blood pressure 160/78, height 5' 10" (1 778 m), weight 82 6 kg (182 lb)  Body mass index is 26 11 kg/m²  General Appearance: NAD, cooperative, alert  Eyes: Anicteric, PERRLA, EOMI  ENT:  Normocephalic, atraumatic, normal mucosa  Neck:  Supple, symmetrical, trachea midline  Resp:  Clear to auscultation bilaterally; no rales, rhonchi or wheezing; respirations unlabored   CV:  S1 S2, Regular rate and rhythm; no murmur, rub, or gallop  GI:  Soft, non-tender, non-distended; normal bowel sounds; no masses, no organomegaly   Rectal: Deferred  Musculoskeletal: No cyanosis, clubbing or edema  Normal ROM  Skin:  No jaundice, rashes, or lesions   Heme/Lymph: No palpable cervical lymphadenopathy  Psych: Normal affect, good eye contact  Neuro: No gross deficits, AAOx3    Lab Results:   Lab Results   Component Value Date    WBC 7 68 02/02/2018    HGB 15 1 02/02/2018    HCT 44 4 02/02/2018    MCV 84 02/02/2018     02/02/2018     Lab Results   Component Value Date    K 4 4 02/02/2018     02/02/2018    CO2 25 02/02/2018    BUN 28 (H) 02/02/2018    CREATININE 1 20 02/02/2018    GLUF 163 (H) 02/02/2018    CALCIUM 9 8 02/02/2018    EGFR 64 02/02/2018     No results found for: IRON, TIBC, FERRITIN  No results found for: LIPASE    Radiology Results:   No results found

## 2022-07-14 DIAGNOSIS — K21.9 GASTROESOPHAGEAL REFLUX DISEASE, UNSPECIFIED WHETHER ESOPHAGITIS PRESENT: ICD-10-CM

## 2022-07-14 RX ORDER — FAMOTIDINE 40 MG/1
TABLET, FILM COATED ORAL
Qty: 90 TABLET | Refills: 4 | Status: SHIPPED | OUTPATIENT
Start: 2022-07-14

## 2023-05-30 ENCOUNTER — APPOINTMENT (EMERGENCY)
Dept: RADIOLOGY | Facility: HOSPITAL | Age: 68
End: 2023-05-30

## 2023-05-30 ENCOUNTER — HOSPITAL ENCOUNTER (EMERGENCY)
Facility: HOSPITAL | Age: 68
Discharge: HOME | End: 2023-05-30
Attending: EMERGENCY MEDICINE | Admitting: EMERGENCY MEDICINE

## 2023-05-30 VITALS
RESPIRATION RATE: 18 BRPM | HEART RATE: 66 BPM | DIASTOLIC BLOOD PRESSURE: 79 MMHG | OXYGEN SATURATION: 97 % | SYSTOLIC BLOOD PRESSURE: 168 MMHG | TEMPERATURE: 98 F

## 2023-05-30 DIAGNOSIS — T14.8XXA AVULSION OF SKIN: Primary | ICD-10-CM

## 2023-05-30 RX ADMIN — TETANUS TOXOID, REDUCED DIPHTHERIA TOXOID AND ACELLULAR PERTUSSIS VACCINE, ADSORBED 0.5 ML: 5; 2.5; 8; 8; 2.5 SUSPENSION INTRAMUSCULAR at 13:32

## 2023-05-30 NOTE — ED NOTES
Patient discharged by provider, ambulatory to waiting room        Mirela Mcdonnell RN  05/30/23 3801

## 2023-05-30 NOTE — ED PROVIDER NOTES
History  Chief Complaint   Patient presents with   • Hand Laceration     Patient c/o middle left finger laceration that occurred today while using a  to clean a bolt  Patient is a 76 y/o M that presents to the ED with skin avulsion to left middle finger that occurred 1 hour ago  He states he was cleaning a bolt on his  and the bolt kicked back and hit his finger  He is right handed  Last tetanus was in 2014  No numbness or tingling  Bleeding controlled with pressure  History provided by:  Patient  Finger Laceration  Location:  Finger  Finger laceration location:  L middle finger  Length:  1cm  Depth:  Cutaneous  Quality: avulsion    Bleeding: controlled with pressure    Time since incident:  1 hour  Injury mechanism:   Pain details:     Quality:  Burning    Severity:  Moderate    Timing:  Constant    Progression:  Unchanged  Foreign body present:  No foreign bodies  Relieved by:  Nothing  Worsened by:  Nothing  Ineffective treatments:  None tried  Tetanus status:  Out of date  Associated symptoms: no fever, no numbness and no swelling        Prior to Admission Medications   Prescriptions Last Dose Informant Patient Reported? Taking?    Liraglutide (VICTOZA) 18 MG/3ML SOPN  Self Yes No   Sig: Inject 1 2 mg under the skin daily   aspirin 81 MG tablet  Self Yes No   Sig: Take 81 mg by mouth daily   famotidine (PEPCID) 40 MG tablet   No No   Sig: TAKE 1 TABLET BY MOUTH DAILY AT BEDTIME   glimepiride (AMARYL) 2 mg tablet  Self Yes No   Sig: Take 2 mg by mouth daily at bedtime   metFORMIN (GLUMETZA) 1000 MG (MOD) 24 hr tablet  Self Yes No   Sig: Take 1,000 mg by mouth 2 (two) times a day with meals   nebivolol (BYSTOLIC) 5 mg tablet  Self Yes No   Sig: Take 5 mg by mouth daily   pravastatin (PRAVACHOL) 40 mg tablet  Self Yes No   Sig: Take 40 mg by mouth daily      Facility-Administered Medications: None       Past Medical History:   Diagnosis Date   • Cataract    • Cervical radiculopathy    • Diabetes mellitus ([facility] [address] )    • Hemorrhoids     grade 1   • Hyperlipidemia    • Hypertension    • Macular degeneration    • Vitamin D insufficiency        Past Surgical History:   Procedure Laterality Date   • CATARACT EXTRACTION Left    • COLONOSCOPY      May 2010:  Normal colonoscopy except for small internal hemorrhoids biopsy negative for microscopic colitis   • EYE SURGERY     • TONSILLECTOMY     • UPPER GASTROINTESTINAL ENDOSCOPY      May 2011:  Irregular Z-line biopsies with inflammation and possible Schrader's esophagus, otherwise normal EGD with biopsies negative for H pylori or celiac  Family History   Problem Relation Age of Onset   • Heart disease Father    • Colon polyps Neg Hx    • Colon cancer Neg Hx      I have reviewed and agree with the history as documented  E-Cigarette/Vaping   • E-Cigarette Use Never User      E-Cigarette/Vaping Substances   • Nicotine No    • THC No    • CBD No    • Flavoring No    • Other No    • Unknown No      Social History     Tobacco Use   • Smoking status: Former     Types: Cigarettes     Quit date: 1995     Years since quittin 3   • Smokeless tobacco: Never   Vaping Use   • Vaping Use: Never used   Substance Use Topics   • Alcohol use: Yes     Alcohol/week: 1 0 standard drink of alcohol     Types: 1 Cans of beer per week     Comment: social   • Drug use: No       Review of Systems   Constitutional: Negative for chills and fever  Skin: Positive for wound  Neurological: Negative for dizziness, weakness, light-headedness and numbness  All other systems reviewed and are negative  Physical Exam  Physical Exam  Vitals and nursing note reviewed  Constitutional:       General: He is not in acute distress  Appearance: Normal appearance  He is well-developed, well-groomed and normal weight  He is not ill-appearing or diaphoretic  HENT:      Head: Normocephalic and atraumatic        Right Ear: External ear normal       Left Ear: External ear normal       Nose: Nose normal    Eyes:      Conjunctiva/sclera: Conjunctivae normal    Cardiovascular:      Rate and Rhythm: Normal rate  Pulmonary:      Effort: Pulmonary effort is normal    Musculoskeletal:      Left hand: Laceration (skin avulsion to volar left distal middle finger  ), tenderness and bony tenderness present  No swelling or deformity  Normal range of motion  Normal capillary refill  Normal pulse  Cervical back: Normal range of motion  Skin:     General: Skin is warm and dry  Findings: Wound (1cm skin avulsion to distal left volar middle finger  ) present  Neurological:      Mental Status: He is alert and oriented to person, place, and time  Sensory: Sensation is intact  Motor: Motor function is intact  Gait: Gait is intact  Psychiatric:         Mood and Affect: Mood normal          Speech: Speech normal          Behavior: Behavior is cooperative  Vital Signs  ED Triage Vitals [05/30/23 1238]   Temperature Pulse Respirations Blood Pressure SpO2   98 °F (36 7 °C) 66 18 168/79 97 %      Temp src Heart Rate Source Patient Position - Orthostatic VS BP Location FiO2 (%)   -- -- -- -- --      Pain Score       6           Vitals:    05/30/23 1238   BP: 168/79   Pulse: 66         Visual Acuity      ED Medications  Medications   tetanus-diphtheria-acellular pertussis (BOOSTRIX) IM injection 0 5 mL (0 5 mL Intramuscular Given 5/30/23 1332)       Diagnostic Studies  Results Reviewed     None                 XR finger third digit-middle LEFT   ED Interpretation by Jorge Murcia PA-C (05/30 1341)   No acute fracture  Procedures  Procedures   1340:  Left middle finger cleaned with saline and solitario stat, surgifoam and bandage applied to finger  No sutures required  ED Course                               SBIRT 22yo+    Flowsheet Row Most Recent Value   Initial Alcohol Screen: US AUDIT-C     1   How often do you have a drink containing alcohol? 0 Filed at: 05/30/2023 1238   2  How many drinks containing alcohol do you have on a typical day you are drinking? 0 Filed at: 05/30/2023 1238   3a  Male UNDER 65: How often do you have five or more drinks on one occasion? 0 Filed at: 05/30/2023 1238   3b  FEMALE Any Age, or MALE 65+: How often do you have 4 or more drinks on one occassion? 0 Filed at: 05/30/2023 1238   Audit-C Score 0 Filed at: 05/30/2023 1238   VAMSI: How many times in the past year have you    Used an illegal drug or used a prescription medication for non-medical reasons? Never Filed at: 05/30/2023 1238                    Medical Decision Making  Patient with skin avulsion left middle finger, will update tetanus, xray to r/o fracture  NO sutures required, will apply surgifoam and bandage to help with bleeding  Return precautions given  Avulsion of skin: acute illness or injury  Amount and/or Complexity of Data Reviewed  Radiology: ordered and independent interpretation performed  Risk  Prescription drug management  Disposition  Final diagnoses:   Avulsion of skin - left middle finger     Time reflects when diagnosis was documented in both MDM as applicable and the Disposition within this note     Time User Action Codes Description Comment    5/30/2023  1:56 PM Libra Stack 33  8XXA] Avulsion of skin     5/30/2023  1:56 PM Ezra Camp  8XXA] Avulsion of skin left middle finger      ED Disposition     ED Disposition   Discharge    Condition   Stable    Date/Time   Tue May 30, 2023  1:56 PM    Cristina Oden discharge to home/self care                 Follow-up Information     Follow up With Specialties Details Why [address], DO Family Medicine Schedule an appointment as soon as possible for a visit in 3 days For recheck [address]  [phone]            Discharge Medication List as of 5/30/2023  1:58 PM      CONTINUE these medications which have NOT CHANGED    Details   aspirin 81 MG tablet Take 81 mg by mouth daily, Historical Med      famotidine (PEPCID) 40 MG tablet TAKE 1 TABLET BY MOUTH DAILY AT BEDTIME, Normal      glimepiride (AMARYL) 2 mg tablet Take 2 mg by mouth daily at bedtime, Historical Med      Liraglutide (VICTOZA) 18 MG/3ML SOPN Inject 1 2 mg under the skin daily, Historical Med      metFORMIN (GLUMETZA) 1000 MG (MOD) 24 hr tablet Take 1,000 mg by mouth 2 (two) times a day with meals, Historical Med      nebivolol (BYSTOLIC) 5 mg tablet Take 5 mg by mouth daily, Historical Med      pravastatin (PRAVACHOL) 40 mg tablet Take 40 mg by mouth daily, Historical Med             No discharge procedures on file      PDMP Review     None          ED Provider  Electronically Signed by           Rosendo Doty PA-C  05/30/23 0082

## 2023-05-30 NOTE — DISCHARGE INSTRUCTIONS
Keep bandage dry and intact for 2-3 days, then remove bandage and soak surgifoam in warm water for 20-30 minutes to gently remove, then clean daily with soap and water, apply antibiotic ointment  Follow up with family doctor in 3 days for recheck  Monitor for signs of infection

## 2024-03-06 ENCOUNTER — TELEPHONE (OUTPATIENT)
Dept: GASTROENTEROLOGY | Facility: CLINIC | Age: 69
End: 2024-03-06

## 2024-03-06 ENCOUNTER — OFFICE VISIT (OUTPATIENT)
Dept: GASTROENTEROLOGY | Facility: CLINIC | Age: 69
End: 2024-03-06
Payer: MEDICARE

## 2024-03-06 VITALS
DIASTOLIC BLOOD PRESSURE: 77 MMHG | WEIGHT: 192 LBS | HEIGHT: 70 IN | BODY MASS INDEX: 27.49 KG/M2 | SYSTOLIC BLOOD PRESSURE: 144 MMHG

## 2024-03-06 DIAGNOSIS — R13.19 ESOPHAGEAL DYSPHAGIA: ICD-10-CM

## 2024-03-06 DIAGNOSIS — Z86.010 HISTORY OF COLON POLYPS: ICD-10-CM

## 2024-03-06 DIAGNOSIS — K22.70 BARRETT'S ESOPHAGUS WITHOUT DYSPLASIA: ICD-10-CM

## 2024-03-06 DIAGNOSIS — K21.9 GASTROESOPHAGEAL REFLUX DISEASE, UNSPECIFIED WHETHER ESOPHAGITIS PRESENT: Primary | ICD-10-CM

## 2024-03-06 PROBLEM — Z86.0100 HISTORY OF COLON POLYPS: Status: ACTIVE | Noted: 2024-03-06

## 2024-03-06 PROCEDURE — 99214 OFFICE O/P EST MOD 30 MIN: CPT | Performed by: INTERNAL MEDICINE

## 2024-03-06 RX ORDER — ROSUVASTATIN CALCIUM 20 MG/1
20 TABLET, COATED ORAL DAILY
COMMUNITY

## 2024-03-06 RX ORDER — FAMOTIDINE 40 MG/1
40 TABLET, FILM COATED ORAL
Qty: 90 TABLET | Refills: 3 | Status: SHIPPED | OUTPATIENT
Start: 2024-03-06

## 2024-03-06 RX ORDER — INSULIN ASPART 100 [IU]/ML
INJECTION, SOLUTION INTRAVENOUS; SUBCUTANEOUS
COMMUNITY
Start: 2024-02-02

## 2024-03-06 RX ORDER — INSULIN DEGLUDEC INJECTION 100 U/ML
28 INJECTION, SOLUTION SUBCUTANEOUS
COMMUNITY
Start: 2024-02-22

## 2024-03-06 RX ORDER — CHLORTHALIDONE 25 MG/1
TABLET ORAL
COMMUNITY
Start: 2024-02-14

## 2024-03-06 NOTE — LETTER
March 6, 2024     Maru Quinteros, DO  [address] [facility] [address]    Patient: Jacob L Markley   YOB: 1955   Date of Visit: 3/6/2024       Dear Dr. Quinteros:    Thank you for referring Jacob Markley to me for evaluation. Below are my notes for this consultation.    If you have questions, please do not hesitate to call me. I look forward to following your patient along with you.         Sincerely,        Carolyn Coats MD        CC: No Recipients    Carolyn Coats MD  3/6/2024  5:03 PM  Sign when Signing Visit  Formerly [facility], later [facility] - Outpatient Follow-up Note  Jacob L Markley 68 y.o. male MRN: [id number]  Encounter: [id number]    ASSESSMENT AND PLAN:      1. Gastroesophageal reflux disease, unspecified whether esophagitis present  - famotidine (PEPCID) 40 MG tablet; Take 1 tablet (40 mg total) by mouth daily at bedtime  Dispense: 90 tablet; Refill: 3  - EGD; Future  2. Schrader's esophagus without dysplasia  - EGD; Future  3. Esophageal dysphagia  GERD clinically stable with only occasional breakthrough symptoms.  Ran out of famotidine now only taking as needed.  Prescription refilled.  States dysphagia has resolved.  Only occasionally experiences discomfort if he eats too quickly.  Discussed 3-year Schrader's surveillance for which he will be due in 2 months.  Reflux diet and precautions, cut and chew food well and take time eating  Refilled famotidine 40 mg at night  Schedule EGD for Schrader's surveillance the spring    4. History of colon polyps  Adenomatous polyps on previous colonoscopies, most recently in 2021.  Surveillance colonoscopy every 5 years recommended.  Next colonoscopy due May 2026      Followup Appointment: For EGD for Schrader's surveillance, office visit 1 year or sooner if needed  ______________________________________________________________________    Chief Complaint   Patient presents with   • Follow-up     HPI:  GERD is doing well.  Rare  episodes of solid food dysphagia if eats too fast.  Much better than before.  Taking famotidine as needed.  Appetite and weight stable, trying to watch diet.  Stools normal.  Discussed surveillance for Schrader's esophagus and for history of polyps.  Due for EGD.    Historical Information  Past Medical History:   Diagnosis Date   • Schrader esophagus    • Cataract    • Cervical radiculopathy    • Colon polyp    • Diabetes mellitus (HCC)    • GERD (gastroesophageal reflux disease)    • Hemorrhoids     grade 1   • Hyperlipidemia    • Hypertension    • Macular degeneration    • Vitamin D insufficiency      Past Surgical History:   Procedure Laterality Date   • CATARACT EXTRACTION Left    • COLONOSCOPY      May 2021 2 adenomatous polyps, internal hemorrhoids.  May 2010:  Normal colonoscopy except for small internal hemorrhoids biopsy negative for microscopic colitis   • EYE SURGERY     • TONSILLECTOMY     • UPPER GASTROINTESTINAL ENDOSCOPY      May 2021 irregular Z-line consistent with short segment Schrader's biopsies positive for Schrader's but negative for dysplasia, EGD otherwise normal May 2011:  Irregular Z-line biopsies with inflammation and possible Schrader's esophagus, otherwise normal EGD with biopsies negative for H pylori or celiac.     Social History     Substance and Sexual Activity   Alcohol Use Yes   • Alcohol/week: 1.0 standard drink of alcohol   • Types: 1 Cans of beer per week    Comment: social     Social History     Substance and Sexual Activity   Drug Use No     Social History     Tobacco Use   Smoking Status Former   • Current packs/day: 0.00   • Types: Cigarettes   • Quit date: 1995   • Years since quittin.1   Smokeless Tobacco Never     Family History   Problem Relation Age of Onset   • Heart disease Father    • Colon polyps Neg Hx    • Colon cancer Neg Hx          Current Outpatient Medications:   •  aspirin 81 MG tablet  •  chlorthalidone 25 mg tablet  •  famotidine (PEPCID) 40 MG  "tablet  •  metFORMIN (GLUMETZA) 1000 MG (MOD) 24 hr tablet  •  NovoLOG FlexPen 100 units/mL injection pen  •  rosuvastatin (CRESTOR) 20 MG tablet  •  Tresiba FlexTouch 100 units/mL injection pen  •  glimepiride (AMARYL) 2 mg tablet  •  Liraglutide (VICTOZA) 18 MG/3ML SOPN  •  nebivolol (BYSTOLIC) 5 mg tablet  •  pravastatin (PRAVACHOL) 40 mg tablet  Allergies   Allergen Reactions   • Iodine Solution [Povidone Iodine] Other (See Comments)     Eye drops; burning sensation in eye   • Lipitor [Atorvastatin]      Reviewed medications and allergies and updated as indicated    PHYSICAL EXAM:    Blood pressure 144/77, height 5' 10\" (1.778 m), weight 87.1 kg (192 lb). Body mass index is 27.55 kg/m².  General Appearance: NAD, cooperative, alert  Eyes: Anicteric, conjunctiva pink  ENT:  Normocephalic, atraumatic, normal mucosa.    Neck:  Supple, symmetrical, trachea midline  Resp:  Clear to auscultation bilaterally; no rales, rhonchi or wheezing; respirations unlabored   CV:  S1 S2, Regular rate and rhythm; no murmur, rub, or gallop.  GI:  Soft, non-tender, non-distended; normal bowel sounds; no masses, no organomegaly   Rectal: Deferred  Musculoskeletal: No cyanosis, clubbing or edema. Normal ROM.  Skin:  No jaundice, rashes, or lesions   Heme/Lymph: No palpable cervical lymphadenopathy  Psych: Normal affect, good eye contact  Neuro: No gross deficits, AAOx3    Lab Results:   Lab Results   Component Value Date    WBC 7.68 02/02/2018    HGB 15.1 02/02/2018    HCT 44.4 02/02/2018    MCV 84 02/02/2018     02/02/2018     Lab Results   Component Value Date    K 4.4 02/02/2018     02/02/2018    CO2 25 02/02/2018    BUN 28 (H) 02/02/2018    CREATININE 1.20 02/02/2018    GLUF 163 (H) 02/02/2018    CALCIUM 9.8 02/02/2018    EGFR 64 02/02/2018       Radiology Results:   No results found.    "

## 2024-03-06 NOTE — PATIENT INSTRUCTIONS
Reflux diet and precautions, cut and chew food well and take time eating  Refilled famotidine 40 mg at night  Schedule EGD for Schrader's surveillance the spring  Next colonoscopy due May 2026

## 2024-03-06 NOTE — PROGRESS NOTES
[facility] - Outpatient Follow-up Note  Jacob L Markley 68 y.o. male MRN: [id number]  Encounter: [id number]    ASSESSMENT AND PLAN:      1. Gastroesophageal reflux disease, unspecified whether esophagitis present  - famotidine (PEPCID) 40 MG tablet; Take 1 tablet (40 mg total) by mouth daily at bedtime  Dispense: 90 tablet; Refill: 3  - EGD; Future  2. Schrader's esophagus without dysplasia  - EGD; Future  3. Esophageal dysphagia  GERD clinically stable with only occasional breakthrough symptoms.  Ran out of famotidine now only taking as needed.  Prescription refilled.  States dysphagia has resolved.  Only occasionally experiences discomfort if he eats too quickly.  Discussed 3-year Schrader's surveillance for which he will be due in 2 months.  Reflux diet and precautions, cut and chew food well and take time eating  Refilled famotidine 40 mg at night  Schedule EGD for Schrader's surveillance the spring    4. History of colon polyps  Adenomatous polyps on previous colonoscopies, most recently in 2021.  Surveillance colonoscopy every 5 years recommended.  Next colonoscopy due May 2026      Followup Appointment: For EGD for Schrader's surveillance, office visit 1 year or sooner if needed  ______________________________________________________________________    Chief Complaint   Patient presents with    Follow-up     HPI:  GERD is doing well.  Rare episodes of solid food dysphagia if eats too fast.  Much better than before.  Taking famotidine as needed.  Appetite and weight stable, trying to watch diet.  Stools normal.  Discussed surveillance for Schrader's esophagus and for history of polyps.  Due for EGD.    Historical Information   Past Medical History:   Diagnosis Date    Schrader esophagus     Cataract     Cervical radiculopathy     Colon polyp     Diabetes mellitus (HCC)     GERD (gastroesophageal reflux disease)     Hemorrhoids     grade 1    Hyperlipidemia     Hypertension      Macular degeneration     Vitamin D insufficiency      Past Surgical History:   Procedure Laterality Date    CATARACT EXTRACTION Left     COLONOSCOPY      May 2021 2 adenomatous polyps, internal hemorrhoids.  May 2010:  Normal colonoscopy except for small internal hemorrhoids biopsy negative for microscopic colitis    EYE SURGERY      TONSILLECTOMY      UPPER GASTROINTESTINAL ENDOSCOPY      May 2021 irregular Z-line consistent with short segment Schrader's biopsies positive for Schrader's but negative for dysplasia, EGD otherwise normal May 2011:  Irregular Z-line biopsies with inflammation and possible Schrader's esophagus, otherwise normal EGD with biopsies negative for H pylori or celiac.     Social History     Substance and Sexual Activity   Alcohol Use Yes    Alcohol/week: 1.0 standard drink of alcohol    Types: 1 Cans of beer per week    Comment: social     Social History     Substance and Sexual Activity   Drug Use No     Social History     Tobacco Use   Smoking Status Former    Current packs/day: 0.00    Types: Cigarettes    Quit date: 1995    Years since quittin.1   Smokeless Tobacco Never     Family History   Problem Relation Age of Onset    Heart disease Father     Colon polyps Neg Hx     Colon cancer Neg Hx          Current Outpatient Medications:     aspirin 81 MG tablet    chlorthalidone 25 mg tablet    famotidine (PEPCID) 40 MG tablet    metFORMIN (GLUMETZA) 1000 MG (MOD) 24 hr tablet    NovoLOG FlexPen 100 units/mL injection pen    rosuvastatin (CRESTOR) 20 MG tablet    Tresiba FlexTouch 100 units/mL injection pen    glimepiride (AMARYL) 2 mg tablet    Liraglutide (VICTOZA) 18 MG/3ML SOPN    nebivolol (BYSTOLIC) 5 mg tablet    pravastatin (PRAVACHOL) 40 mg tablet  Allergies   Allergen Reactions    Iodine Solution [Povidone Iodine] Other (See Comments)     Eye drops; burning sensation in eye    Lipitor [Atorvastatin]      Reviewed medications and allergies and updated as indicated    PHYSICAL  "EXAM:    Blood pressure 144/77, height 5' 10\" (1.778 m), weight 87.1 kg (192 lb). Body mass index is 27.55 kg/m².  General Appearance: NAD, cooperative, alert  Eyes: Anicteric, conjunctiva pink  ENT:  Normocephalic, atraumatic, normal mucosa.    Neck:  Supple, symmetrical, trachea midline  Resp:  Clear to auscultation bilaterally; no rales, rhonchi or wheezing; respirations unlabored   CV:  S1 S2, Regular rate and rhythm; no murmur, rub, or gallop.  GI:  Soft, non-tender, non-distended; normal bowel sounds; no masses, no organomegaly   Rectal: Deferred  Musculoskeletal: No cyanosis, clubbing or edema. Normal ROM.  Skin:  No jaundice, rashes, or lesions   Heme/Lymph: No palpable cervical lymphadenopathy  Psych: Normal affect, good eye contact  Neuro: No gross deficits, AAOx3    Lab Results:   Lab Results   Component Value Date    WBC 7.68 02/02/2018    HGB 15.1 02/02/2018    HCT 44.4 02/02/2018    MCV 84 02/02/2018     02/02/2018     Lab Results   Component Value Date    K 4.4 02/02/2018     02/02/2018    CO2 25 02/02/2018    BUN 28 (H) 02/02/2018    CREATININE 1.20 02/02/2018    GLUF 163 (H) 02/02/2018    CALCIUM 9.8 02/02/2018    EGFR 64 02/02/2018       Radiology Results:   No results found.    "

## 2024-03-06 NOTE — TELEPHONE ENCOUNTER
Scheduled date of EGD(as of today): 4/9/2024  Physician performing EGD: PRINCESS  Location of EGD: [facility]  Instructions reviewed with patient by: FRANNY  Clearances: N

## 2024-03-26 ENCOUNTER — ANESTHESIA EVENT (OUTPATIENT)
Dept: ANESTHESIOLOGY | Facility: AMBULATORY SURGERY CENTER | Age: 69
End: 2024-03-26

## 2024-03-26 ENCOUNTER — ANESTHESIA (OUTPATIENT)
Dept: ANESTHESIOLOGY | Facility: AMBULATORY SURGERY CENTER | Age: 69
End: 2024-03-26

## 2024-04-01 ENCOUNTER — TELEPHONE (OUTPATIENT)
Dept: GASTROENTEROLOGY | Facility: CLINIC | Age: 69
End: 2024-04-01

## 2024-04-01 NOTE — TELEPHONE ENCOUNTER
Procedure confirmed  Endoscopy     Via: Spoke with patient.      Instructions given: Given to Patient at Visit     Prep Given: N/A    Call the office if there are any questions.

## 2024-04-05 ENCOUNTER — TELEPHONE (OUTPATIENT)
Dept: GASTROENTEROLOGY | Facility: AMBULATORY SURGERY CENTER | Age: 69
End: 2024-04-05

## 2024-04-09 ENCOUNTER — ANESTHESIA (OUTPATIENT)
Dept: GASTROENTEROLOGY | Facility: AMBULATORY SURGERY CENTER | Age: 69
End: 2024-04-09

## 2024-04-09 ENCOUNTER — TELEPHONE (OUTPATIENT)
Age: 69
End: 2024-04-09

## 2024-04-09 ENCOUNTER — ANESTHESIA EVENT (OUTPATIENT)
Dept: GASTROENTEROLOGY | Facility: AMBULATORY SURGERY CENTER | Age: 69
End: 2024-04-09

## 2024-04-09 ENCOUNTER — HOSPITAL ENCOUNTER (OUTPATIENT)
Dept: GASTROENTEROLOGY | Facility: AMBULATORY SURGERY CENTER | Age: 69
Discharge: HOME | End: 2024-04-09

## 2024-04-09 VITALS
SYSTOLIC BLOOD PRESSURE: 109 MMHG | HEIGHT: 70 IN | WEIGHT: 192 LBS | RESPIRATION RATE: 19 BRPM | DIASTOLIC BLOOD PRESSURE: 65 MMHG | TEMPERATURE: 97.5 F | OXYGEN SATURATION: 94 % | HEART RATE: 73 BPM | BODY MASS INDEX: 27.49 KG/M2

## 2024-04-09 DIAGNOSIS — K22.70 BARRETT'S ESOPHAGUS WITHOUT DYSPLASIA: ICD-10-CM

## 2024-04-09 DIAGNOSIS — K21.9 GASTROESOPHAGEAL REFLUX DISEASE, UNSPECIFIED WHETHER ESOPHAGITIS PRESENT: ICD-10-CM

## 2024-04-09 PROCEDURE — 88305 TISSUE EXAM BY PATHOLOGIST: CPT | Performed by: PATHOLOGY

## 2024-04-09 RX ORDER — OMEGA-3/DHA/EPA/FISH OIL 60 MG-90MG
1 CAPSULE ORAL DAILY
COMMUNITY

## 2024-04-09 RX ORDER — LIDOCAINE HYDROCHLORIDE 10 MG/ML
INJECTION, SOLUTION EPIDURAL; INFILTRATION; INTRACAUDAL; PERINEURAL AS NEEDED
Status: DISCONTINUED | OUTPATIENT
Start: 2024-04-09 | End: 2024-04-09

## 2024-04-09 RX ORDER — PROPOFOL 10 MG/ML
INJECTION, EMULSION INTRAVENOUS AS NEEDED
Status: DISCONTINUED | OUTPATIENT
Start: 2024-04-09 | End: 2024-04-09

## 2024-04-09 RX ORDER — AFLIBERCEPT 40 MG/ML
1 INJECTION, SOLUTION INTRAVITREAL AS NEEDED
COMMUNITY

## 2024-04-09 RX ORDER — SODIUM CHLORIDE, SODIUM LACTATE, POTASSIUM CHLORIDE, CALCIUM CHLORIDE 600; 310; 30; 20 MG/100ML; MG/100ML; MG/100ML; MG/100ML
50 INJECTION, SOLUTION INTRAVENOUS CONTINUOUS
Status: DISCONTINUED | OUTPATIENT
Start: 2024-04-09 | End: 2024-04-13 | Stop reason: HOSPADM

## 2024-04-09 RX ADMIN — PROPOFOL 50 MG: 10 INJECTION, EMULSION INTRAVENOUS at 09:44

## 2024-04-09 RX ADMIN — SODIUM CHLORIDE, SODIUM LACTATE, POTASSIUM CHLORIDE, CALCIUM CHLORIDE 50 ML/HR: 600; 310; 30; 20 INJECTION, SOLUTION INTRAVENOUS at 08:57

## 2024-04-09 RX ADMIN — LIDOCAINE HYDROCHLORIDE 100 MG: 10 INJECTION, SOLUTION EPIDURAL; INFILTRATION; INTRACAUDAL; PERINEURAL at 09:42

## 2024-04-09 RX ADMIN — PROPOFOL 25 MG: 10 INJECTION, EMULSION INTRAVENOUS at 09:47

## 2024-04-09 RX ADMIN — PROPOFOL 150 MG: 10 INJECTION, EMULSION INTRAVENOUS at 09:42

## 2024-04-09 RX ADMIN — PROPOFOL 25 MG: 10 INJECTION, EMULSION INTRAVENOUS at 09:49

## 2024-04-09 RX ADMIN — PROPOFOL 25 MG: 10 INJECTION, EMULSION INTRAVENOUS at 09:51

## 2024-04-09 RX ADMIN — PROPOFOL 25 MG: 10 INJECTION, EMULSION INTRAVENOUS at 09:53

## 2024-04-09 NOTE — ANESTHESIA PREPROCEDURE EVALUATION
Procedure:  EGD    Relevant Problems   ANESTHESIA (within normal limits)      CARDIO   (+) Hyperlipidemia   (+) Hypertension      GI/HEPATIC   (+) Gastroesophageal reflux disease      Endocrine   (+) Diabetes mellitus (HCC)      Orthopedic/Musculoskeletal   (+) Cervical radiculopathy      FEN/Gastrointestinal   (+) Schrader's esophagus without dysplasia        Physical Exam    Airway    Mallampati score: II  TM Distance: >3 FB  Neck ROM: full     Dental   Comment: No reported loose, No notable dental hx     Cardiovascular  Cardiovascular exam normal    Pulmonary  Pulmonary exam normal     Other Findings        Anesthesia Plan  ASA Score- 2     Anesthesia Type- IV sedation with anesthesia with ASA Monitors.         Additional Monitors:     Airway Plan:     Comment: I discussed risks (reviewed with patient on the anesthesia consent form), benefits and alternatives of monitored sedation including the possibility under sedation to have recall or mild discomfort.  .       Plan Factors-    Chart reviewed.    Patient summary reviewed.                  Induction- intravenous.    Postoperative Plan-     Informed Consent- Anesthetic plan and risks discussed with patient.  I personally reviewed this patient with the CRNA. Discussed and agreed on the Anesthesia Plan with the CRNA..

## 2024-04-09 NOTE — H&P
History and Physical -  [facility]  Jacob L Markley 68 y.o. male MRN: [id number]    HPI: Jacob L Markley is a 68 y.o. year old male who presents for surveillance EGD for Schrader's esophagus    REVIEW OF SYSTEMS: Per the HPI, and otherwise unremarkable.    Historical Information   Past Medical History:   Diagnosis Date    Schrader esophagus     Cataract     Cervical radiculopathy     Colon polyp     Diabetes mellitus (HCC)     GERD (gastroesophageal reflux disease)     Hemorrhoids     grade 1    Hyperlipidemia     Hypertension     Macular degeneration     Vitamin D insufficiency      Past Surgical History:   Procedure Laterality Date    CATARACT EXTRACTION Left     COLONOSCOPY      May 2021 2 adenomatous polyps, internal hemorrhoids.  May 2010:  Normal colonoscopy except for small internal hemorrhoids biopsy negative for microscopic colitis    EYE SURGERY      TONSILLECTOMY      UPPER GASTROINTESTINAL ENDOSCOPY      May 2021 irregular Z-line consistent with short segment Schrader's biopsies positive for Schrader's but negative for dysplasia, EGD otherwise normal May 2011:  Irregular Z-line biopsies with inflammation and possible Schrader's esophagus, otherwise normal EGD with biopsies negative for H pylori or celiac.     Social History   Social History     Substance and Sexual Activity   Alcohol Use Yes    Alcohol/week: 1.0 standard drink of alcohol    Types: 1 Cans of beer per week    Comment: social     Social History     Substance and Sexual Activity   Drug Use No     Social History     Tobacco Use   Smoking Status Former    Types: Pipe, Cigars   Smokeless Tobacco Never     Family History   Problem Relation Age of Onset    Heart disease Father     Colon polyps Neg Hx     Colon cancer Neg Hx        Meds/Allergies       Current Outpatient Medications:     aflibercept (Eylea) 2 MG/0.05ML SOLN    aspirin 81 MG tablet    chlorthalidone 25 mg tablet    famotidine (PEPCID) 40 MG tablet    metFORMIN  "(GLUMETZA) 1000 MG (MOD) 24 hr tablet    NovoLOG FlexPen 100 units/mL injection pen    Omega-3 Fatty Acids (Fish Oil) 500 MG CAPS    rosuvastatin (CRESTOR) 20 MG tablet    Tresiba FlexTouch 100 units/mL injection pen    glimepiride (AMARYL) 2 mg tablet    Liraglutide (VICTOZA) 18 MG/3ML SOPN    nebivolol (BYSTOLIC) 5 mg tablet    pravastatin (PRAVACHOL) 40 mg tablet    Current Facility-Administered Medications:     lactated ringers infusion, 50 mL/hr, Intravenous, Continuous, 50 mL/hr at 04/09/24 0857    Allergies   Allergen Reactions    Iodine Solution [Povidone Iodine] Other (See Comments)     Eye drops; burning sensation in eye    Lipitor [Atorvastatin]        Objective     /73   Pulse 86   Temp 97.5 °F (36.4 °C) (Temporal)   Resp 20   Ht 5' 10\" (1.778 m)   Wt 87.1 kg (192 lb)   SpO2 95%   BMI 27.55 kg/m²     PHYSICAL EXAM    Gen: NAD AAOx3  Head: Normocephalic, Atraumatic  CV: S1S2 RRR no m/r/g  CHEST: Clear b/l no c/r/w  ABD: soft, +BS NT/ND  EXT: no edema    ASSESSMENT/PLAN:  This is a 68 y.o. year old male here for EGD, and he is stable and optimized for his procedure.        "

## 2024-04-09 NOTE — ANESTHESIA POSTPROCEDURE EVALUATION
Post-Op Assessment Note    CV Status:  Stable  Pain Score: 0    Pain management: adequate       Mental Status:  Sleepy and arousable   Hydration Status:  Euvolemic   PONV Controlled:  Controlled   Airway Patency:  Patent     Post Op Vitals Reviewed: Yes    No anethesia notable event occurred.    Staff: CRNA               BP   100/56   Temp 97   Pulse 75   Resp 16   SpO2 96

## 2024-04-11 PROCEDURE — 88305 TISSUE EXAM BY PATHOLOGIST: CPT | Performed by: PATHOLOGY

## 2025-02-26 DIAGNOSIS — K21.9 GASTROESOPHAGEAL REFLUX DISEASE, UNSPECIFIED WHETHER ESOPHAGITIS PRESENT: ICD-10-CM

## 2025-02-27 RX ORDER — FAMOTIDINE 40 MG/1
40 TABLET, FILM COATED ORAL
Qty: 90 TABLET | Refills: 0 | Status: SHIPPED | OUTPATIENT
Start: 2025-02-27

## 2025-05-25 DIAGNOSIS — K21.9 GASTROESOPHAGEAL REFLUX DISEASE, UNSPECIFIED WHETHER ESOPHAGITIS PRESENT: ICD-10-CM

## 2025-05-27 RX ORDER — FAMOTIDINE 40 MG/1
40 TABLET, FILM COATED ORAL
Qty: 90 TABLET | Refills: 0 | Status: SHIPPED | OUTPATIENT
Start: 2025-05-27

## 2025-06-26 ENCOUNTER — OFFICE VISIT (OUTPATIENT)
Dept: ENDOCRINOLOGY | Facility: HOSPITAL | Age: 70
End: 2025-06-26
Payer: MEDICARE

## 2025-06-26 ENCOUNTER — TELEPHONE (OUTPATIENT)
Dept: ENDOCRINOLOGY | Facility: HOSPITAL | Age: 70
End: 2025-06-26

## 2025-06-26 VITALS
HEART RATE: 71 BPM | WEIGHT: 186.8 LBS | DIASTOLIC BLOOD PRESSURE: 80 MMHG | HEIGHT: 70 IN | SYSTOLIC BLOOD PRESSURE: 140 MMHG | BODY MASS INDEX: 26.74 KG/M2

## 2025-06-26 DIAGNOSIS — I10 PRIMARY HYPERTENSION: ICD-10-CM

## 2025-06-26 DIAGNOSIS — N18.31 STAGE 3A CHRONIC KIDNEY DISEASE (HCC): ICD-10-CM

## 2025-06-26 DIAGNOSIS — Z79.4 TYPE 2 DIABETES MELLITUS WITH HYPERGLYCEMIA, WITH LONG-TERM CURRENT USE OF INSULIN (HCC): ICD-10-CM

## 2025-06-26 DIAGNOSIS — E78.2 MIXED HYPERLIPIDEMIA: ICD-10-CM

## 2025-06-26 DIAGNOSIS — E11.42 DIABETIC POLYNEUROPATHY ASSOCIATED WITH TYPE 2 DIABETES MELLITUS (HCC): ICD-10-CM

## 2025-06-26 DIAGNOSIS — E11.65 TYPE 2 DIABETES MELLITUS WITH HYPERGLYCEMIA, WITH LONG-TERM CURRENT USE OF INSULIN (HCC): ICD-10-CM

## 2025-06-26 DIAGNOSIS — E11.3293 TYPE 2 DIABETES MELLITUS WITH BOTH EYES AFFECTED BY MILD NONPROLIFERATIVE RETINOPATHY WITHOUT MACULAR EDEMA, WITH LONG-TERM CURRENT USE OF INSULIN (HCC): ICD-10-CM

## 2025-06-26 DIAGNOSIS — Z79.4 TYPE 2 DIABETES MELLITUS WITH LEFT EYE AFFECTED BY MILD NONPROLIFERATIVE RETINOPATHY AND MACULAR EDEMA, WITH LONG-TERM CURRENT USE OF INSULIN (HCC): ICD-10-CM

## 2025-06-26 DIAGNOSIS — E11.9 TYPE 2 DIABETES MELLITUS WITHOUT COMPLICATION, UNSPECIFIED WHETHER LONG TERM INSULIN USE (HCC): Primary | ICD-10-CM

## 2025-06-26 DIAGNOSIS — Z79.4 TYPE 2 DIABETES MELLITUS WITH BOTH EYES AFFECTED BY MILD NONPROLIFERATIVE RETINOPATHY WITHOUT MACULAR EDEMA, WITH LONG-TERM CURRENT USE OF INSULIN (HCC): ICD-10-CM

## 2025-06-26 DIAGNOSIS — E11.3212 TYPE 2 DIABETES MELLITUS WITH LEFT EYE AFFECTED BY MILD NONPROLIFERATIVE RETINOPATHY AND MACULAR EDEMA, WITH LONG-TERM CURRENT USE OF INSULIN (HCC): ICD-10-CM

## 2025-06-26 LAB — SL AMB POCT HEMOGLOBIN AIC: 7.3 (ref ?–6.5)

## 2025-06-26 PROCEDURE — 83036 HEMOGLOBIN GLYCOSYLATED A1C: CPT | Performed by: INTERNAL MEDICINE

## 2025-06-26 PROCEDURE — 99204 OFFICE O/P NEW MOD 45 MIN: CPT | Performed by: INTERNAL MEDICINE

## 2025-06-26 PROCEDURE — 95251 CONT GLUC MNTR ANALYSIS I&R: CPT | Performed by: INTERNAL MEDICINE

## 2025-06-26 RX ORDER — PEN NEEDLE, DIABETIC 32GX 5/32"
NEEDLE, DISPOSABLE MISCELLANEOUS
COMMUNITY
Start: 2025-06-18

## 2025-06-26 RX ORDER — LOSARTAN POTASSIUM 50 MG/1
TABLET ORAL DAILY
COMMUNITY

## 2025-06-26 RX ORDER — REPAGLINIDE 0.5 MG/1
0.5 TABLET ORAL
Qty: 90 TABLET | Refills: 2 | Status: SHIPPED | OUTPATIENT
Start: 2025-06-26

## 2025-06-26 NOTE — ASSESSMENT & PLAN NOTE
Lab Results   Component Value Date    HGBA1C 7.3 (A) 06/26/2025       Orders:    Comprehensive metabolic panel; Future    Hemoglobin A1C; Future    TSH, 3rd generation; Future

## 2025-06-26 NOTE — PATIENT INSTRUCTIONS
Hgba1c is 7.3%. this is much better, good job.     Continue the same metformin and tresiba insulin.     We'll try adding repaglanide 0.5 mg at dinner/evening meal only.    Continue to use the chloe.    Diabetes education visit for nutrition.     Continue to work on diet and getting regular activity in.     Follow up in 3-4 months with blood work.

## 2025-06-26 NOTE — ASSESSMENT & PLAN NOTE
Lab Results   Component Value Date    HGBA1C 7.3 (A) 06/26/2025       Orders:    repaglinide (PRANDIN) 0.5 mg tablet; Take 1 tablet (0.5 mg total) by mouth daily with dinner    Ambulatory Referral to Diabetic Education; Future    Comprehensive metabolic panel; Future    Hemoglobin A1C; Future    TSH, 3rd generation; Future

## 2025-06-26 NOTE — ASSESSMENT & PLAN NOTE
Lab Results   Component Value Date    HGBA1C 7.3 (A) 06/26/2025       Orders:    POCT hemoglobin A1c

## 2025-06-26 NOTE — ASSESSMENT & PLAN NOTE
Orders:    Comprehensive metabolic panel; Future    Hemoglobin A1C; Future    TSH, 3rd generation; Future

## 2025-06-26 NOTE — ASSESSMENT & PLAN NOTE
Lab Results   Component Value Date    EGFR 64 02/02/2018    EGFR 73 01/29/2018    CREATININE 1.20 02/02/2018    CREATININE 1.07 01/29/2018

## 2025-06-26 NOTE — PROGRESS NOTES
Name: Jacob L Markley      : 1955      MRN: [id number]  Encounter Provider: Hilary Hayes MD  Encounter Date: 2025   Encounter department: [facility] [place]    No chief complaint on file.  :  Assessment & Plan  Type 2 diabetes mellitus without complication, unspecified whether long term insulin use (HCC)    Lab Results   Component Value Date    HGBA1C 7.3 (A) 2025       Orders:    POCT hemoglobin A1c    Type 2 diabetes mellitus with hyperglycemia, with long-term current use of insulin ([facility])    Lab Results   Component Value Date    HGBA1C 7.3 (A) 2025       Orders:    repaglinide (PRANDIN) 0.5 mg tablet; Take 1 tablet (0.5 mg total) by mouth daily with dinner    Ambulatory Referral to Diabetic Education; Future    Comprehensive metabolic panel; Future    Hemoglobin A1C; Future    TSH, 3rd generation; Future    Primary hypertension    Orders:    Comprehensive metabolic panel; Future    Hemoglobin A1C; Future    TSH, 3rd generation; Future    Mixed hyperlipidemia    Orders:    Comprehensive metabolic panel; Future    Hemoglobin A1C; Future    TSH, 3rd generation; Future    Type 2 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, with long-term current use of insulin (HCC)    Lab Results   Component Value Date    HGBA1C 7.3 (A) 2025            Type 2 diabetes mellitus with both eyes affected by mild nonproliferative retinopathy without macular edema, with long-term current use of insulin (HCC)    Lab Results   Component Value Date    HGBA1C 7.3 (A) 2025            Stage 3a chronic kidney disease (HCC)  Lab Results   Component Value Date    EGFR 64 2018    EGFR 73 2018    CREATININE 1.20 2018    CREATININE 1.07 2018            Diabetic polyneuropathy associated with type 2 diabetes mellitus (HCC)    Lab Results   Component Value Date    HGBA1C 7.3 (A) 2025        Orders:    Comprehensive metabolic panel; Future    Hemoglobin A1C; Future    TSH, 3rd generation; Future      Assessment & Plan  1.  Type II diabetes mellitus, insulin requiring.  - Hemoglobin A1c level has improved to 7.3 from 9.4 in 02/2025.  - Reports using a Joe device for glucose monitoring.  - Discussed current regimen of metformin 1000 mg twice a day and Tresiba insulin 32 units daily.  - Repaglinide 0.5 mg added to evening meal medication routine. Referral for diabetes education initiated for medical nutrition therapy. Advised to maintain a healthy diet and regular physical activity. Instructed to inform if experiencing hypoglycemia in the morning or overnight after starting repaglinide for potential Tresiba dosage adjustment.    2. Diabetic neuropathy.  - Reports very light tingling in feet, recently started.  - Foot exam revealed mild neuropathy.  - Advised to monitor for any foot wounds or sores and consider wearing supportive shoes like Hoka or BRUNT work boots.    3. Diabetic retinopathy.  - Receives Eylea injections in left eye for retinopathy and visits eye doctor every 2-3 months.  - Reports blurry vision and has had a left cataract removed.  - Advised to continue regular follow-ups with eye doctor.    4.  Hypertension.  Blood pressure high normal.  Continue chlorthalidone and losartan.    5.  Hyperlipidemia.  Lipid profile is excellent.  Will continue rosuvastatin 20 mg daily.    Follow-up: 09/2025 with preceding hemoglobin A1c, CMP, and TSH.      Pertinent Medical History   Jacob L Markley is a 69-year-old male with a history of type 2 diabetes insulin requiring diagnosed about 22 years ago at the age of 48.  He has utilized Jardiance, Farxiga, Januvia or Tradjenta, Ozempic, Victoza, and glimepiride in the past.    Diabetes complications include retinopathy and neuropathy as he denies nephropathy, heart attack, stroke, or claudication.        History of Present Illness   History of Present  Illness  Jacob L Markley is a 69-year-old male here for evaluation/consultation regarding his diabetes.    He has been living with diabetes for approximately 22 years, diagnosed at the age of 48. His current medication regimen includes metformin 1000 mg twice daily and Tresiba insulin 32 units. He has been on insulin therapy for several years. He has also tried Jardiance, Farxiga, Ozempic, and Victoza, but these were discontinued due to insurance issues. He has previously been prescribed glimepiride, Januvia, and Tradjenta, but these were discontinued due to insurance issues.     He reports no excessive urination but does experience nighttime urination, which disrupts his sleep. He does not report excessive thirst but admits to constant hunger. He has lost over 10 pounds through dietary changes. He has identified rice, potatoes, and noodles as foods that significantly raise his blood sugar levels. He occasionally experiences low blood sugar levels in the morning, which he can detect due to an increased heart rate. He tries to avoid eating after 5:00 PM and if he does eat, it is usually protein. He has a family history of diabetes on both sides. He attended a diabetes education class many years ago and is interested in a refresher course.    He reports mild tingling in his feet but has no history of foot wounds or sores. He does not see a podiatrist.    He has been diagnosed with retinopathy and receives Eylea injections in his left eye every 2 to 3 months. He had a left cataract removed and is considering surgery for a right cataract. His last visit to the ophthalmologist was 2 months ago, and he has an appointment scheduled for 07/2025.    He is currently taking baby aspirin, chlorthalidone 25 mg, losartan 50 mg, famotidine, nebivolol, and rosuvastatin.  He reports no chest pain or shortness of breath.     FAMILY HISTORY  His mother had diabetes and passed away at age 83. His father had diabetes and was a heavy  "smoker, passing away at age 54 due to heart issues. Both maternal and paternal grandparents had diabetes. His older sister has diabetes, and his younger sister had a brain infection.     CGM Interpretation:  Date Range: 6/13/2025 through 6/26/2025  Device used: Freestyle chloe 3  Type: Type 2 Diabetes  Home use , CGM active 97% of the time   Analysis of data:   Average Glucose: 164 mg/dL  GMI: 7.2%  Coefficient of Variation: 32.6%  Glucose Variability: 32.6%  Time in Target Range: 65%   Time Above Range: 28% high, 7% very high  Time Below Range: 0% low, 0% very low  Interpretation of data: Overall CGM download demonstrates relatively stable sugars during the day with spikes particularly post dinner but some occasional once post lunch.  More than 72 hours of data was reviewed. Report to be scanned to chart.     Current diabetic regimen:   He takes metformin 1000 mg twice a day and Tresiba insulin 32 units daily for diabetes management.    He takes chlorthalidone 25 mg daily and losartan 50 mg daily for hypertension.    He takes rosuvastatin 20 mg daily for hyperlipidemia.      Review of Systems as per HPI    Medical History Reviewed by provider this encounter:  Tobacco  Allergies  Meds  Problems  Med Hx  Surg Hx  Fam Hx     .  Medications Ordered Prior to Encounter[1]   Social History[2]     Medical History Reviewed by provider this encounter:  Tobacco  Allergies  Meds  Problems  Med Hx  Surg Hx  Fam Hx     .    Objective   /80 (BP Location: Left arm, Patient Position: Sitting, Cuff Size: Adult)   Pulse 71   Ht 5' 10\" (1.778 m)   Wt 84.7 kg (186 lb 12.8 oz)   BMI 26.80 kg/m²      Body mass index is 26.8 kg/m².  Wt Readings from Last 3 Encounters:   06/26/25 84.7 kg (186 lb 12.8 oz)   04/09/24 87.1 kg (192 lb)   03/06/24 87.1 kg (192 lb)     Physical Exam    Physical Exam  Vitals and nursing note reviewed.   Constitutional:       General: He is not in acute distress.     Appearance: Normal " appearance. He is well-developed.   HENT:      Head: Normocephalic and atraumatic.     Eyes:      Conjunctiva/sclera: Conjunctivae normal.     Neck:      Vascular: No carotid bruit.      Comments: Thyroid normal in size.  No palpable thyroid nodules.  Cardiovascular:      Rate and Rhythm: Normal rate and regular rhythm.      Pulses: Normal pulses. no weak pulses.           Dorsalis pedis pulses are 2+ on the right side and 2+ on the left side.        Posterior tibial pulses are 2+ on the right side and 2+ on the left side.      Heart sounds: Normal heart sounds. No murmur heard.  Pulmonary:      Effort: Pulmonary effort is normal. No respiratory distress.      Breath sounds: Normal breath sounds. No wheezing.   Abdominal:      Palpations: Abdomen is soft.     Musculoskeletal:         General: No swelling.      Cervical back: Neck supple.      Right lower leg: No edema.      Left lower leg: No edema.      Comments: No ulcerations of the feet.  No tremor of the outstretched hands.  No CVA tenderness.   Feet:      Right foot:      Skin integrity: No ulcer, skin breakdown, erythema, warmth, callus or dry skin.      Left foot:      Skin integrity: No ulcer, skin breakdown, erythema, warmth, callus or dry skin.   Lymphadenopathy:      Cervical: No cervical adenopathy.     Skin:     General: Skin is warm and dry.      Capillary Refill: Capillary refill takes less than 2 seconds.     Neurological:      Mental Status: He is alert and oriented to person, place, and time.      Comments: Sensation vibration diminished to the first toe DIP joint bilaterally sensation. Monofilament bilaterally intact.  Patellar deep tendon reflexes normal.   Psychiatric:         Mood and Affect: Mood normal.     Patient's shoes and socks removed.    Right Foot/Ankle   Right Foot Inspection  Skin Exam: skin normal and skin intact. No dry skin, no warmth, no callus, no erythema, no maceration, no abnormal color, no pre-ulcer, no ulcer and no  callus.     Toe Exam: No swelling and  no right toe deformity    Sensory   Vibration: diminished  Monofilament testing: intact    Vascular  Capillary refills: < 3 seconds  The right DP pulse is 2+. The right PT pulse is 2+.     Left Foot/Ankle  Left Foot Inspection  Skin Exam: skin normal and skin intact. No dry skin, no warmth, no erythema, no maceration, normal color, no pre-ulcer, no ulcer and no callus.     Toe Exam: No swelling and no left toe deformity.     Sensory   Vibration: diminished  Monofilament testing: intact    Vascular  Capillary refills: < 3 seconds  The left DP pulse is 2+. The left PT pulse is 2+.     Assign Risk Category  No deformity present  Loss of protective sensation  No weak pulses  Risk: 1    Last Eye Exam: Not on file  Last Foot Exam: Not on file  Health Maintenance   Topic Date Due    Diabetic Foot Exam  05/30/2015    Diabetic Eye Exam  06/16/2015       Results    Labs: I have reviewed pertinent labs including:   Lab Results   Component Value Date    HGBA1C 7.3 (A) 06/26/2025    HGBA1C 8.3 04/19/2021    HGBA1C 8.1 (H) 01/29/2018      Blood work performed on 2/19/2025 showed a hemoglobin A1c of 9.4%.    Urine microalbumin to creatinine ratio was 22.    Total cholesterol 114, triglyceride 139, HDL 39, LDL 51.    CMP showed glucose of 96 fasting, creatinine 1.51 with a GFR 49, calcium 10.3 with an albumin of 4.6 but was otherwise normal.    Lab Results   Component Value Date    CREATININE 1.20 02/02/2018    CREATININE 1.07 01/29/2018    BUN 28 (H) 02/02/2018    K 4.4 02/02/2018     02/02/2018    CO2 25 02/02/2018      eGFR   Date Value Ref Range Status   02/02/2018 64 ml/min/1.73sq m Final      Cholesterol   Date Value Ref Range Status   05/14/2014 177 125 - 200 mg/dL Final     HDL   Date Value Ref Range Status   05/14/2014 46 > OR = 40 mg/dL Final     Triglycerides   Date Value Ref Range Status   05/14/2014 65 <150 mg/dL Final           Patient Instructions   Hgba1c is 7.3%. this is  much better, good job.     Continue the same metformin and tresiba insulin.     We'll try adding repaglanide 0.5 mg at dinner/evening meal only.    Continue to use the chloe.    Diabetes education visit for nutrition.     Continue to work on diet and getting regular activity in.     Follow up in 3-4 months with blood work.     Discussed with the patient and all questioned fully answered. He will call me if any problems arise.           [1]   Current Outpatient Medications on File Prior to Visit   Medication Sig Dispense Refill    aspirin 81 MG tablet Take 81 mg by mouth in the morning.      chlorthalidone 25 mg tablet       Embecta Pen Needle Bridgett 2 Gen 32G X 4 MM MISC       famotidine (PEPCID) 40 MG tablet TAKE 1 TABLET BY MOUTH EVERYDAY AT BEDTIME 90 tablet 0    losartan (COZAAR) 50 mg tablet Take by mouth daily      metFORMIN (GLUMETZA) 1000 MG (MOD) 24 hr tablet Take 1,000 mg by mouth in the morning and 1,000 mg in the evening. Take with meals.      rosuvastatin (CRESTOR) 20 MG tablet Take 20 mg by mouth in the morning.      Tresiba FlexTouch 100 units/mL injection pen 32 Units daily at bedtime      aflibercept (Eylea) 2 MG/0.05ML SOLN 1 vial by Intravitreal route if needed (every 6-8 weeks)       No current facility-administered medications on file prior to visit.   [2]   Social History  Tobacco Use    Smoking status: Former     Types: Pipe, Cigars    Smokeless tobacco: Never   Vaping Use    Vaping status: Never Used   Substance and Sexual Activity    Alcohol use: Yes     Alcohol/week: 1.0 standard drink of alcohol     Types: 1 Cans of beer per week     Comment: social    Drug use: No

## 2025-07-16 ENCOUNTER — OFFICE VISIT (OUTPATIENT)
Dept: DIABETES SERVICES | Facility: HOSPITAL | Age: 70
End: 2025-07-16
Payer: MEDICARE

## 2025-07-16 VITALS — HEIGHT: 70 IN | BODY MASS INDEX: 26.63 KG/M2 | WEIGHT: 186 LBS

## 2025-07-16 DIAGNOSIS — E11.65 TYPE 2 DIABETES MELLITUS WITH HYPERGLYCEMIA, WITH LONG-TERM CURRENT USE OF INSULIN (HCC): Primary | ICD-10-CM

## 2025-07-16 DIAGNOSIS — Z79.4 TYPE 2 DIABETES MELLITUS WITH HYPERGLYCEMIA, WITH LONG-TERM CURRENT USE OF INSULIN (HCC): Primary | ICD-10-CM

## 2025-07-16 PROCEDURE — 97802 MEDICAL NUTRITION INDIV IN: CPT | Performed by: DIETITIAN, REGISTERED

## 2025-07-16 NOTE — PROGRESS NOTES
"Medical Nutrition Therapy     Assessment    Visit Type: Initial visit  Chief complaint:  Type 2 diabetes     HPI: Met with Sean for initial medical nutrition therapy.  States type 2 diabetes for over 20 years.  Current A1c 7.3% improved from the 8% range a few years ago.  He is very cognizant of his diabetes and watching his eating.  New patient of endocrinology.  He wears a freestyle chloe.  Download shows blood sugars are fairly consistent throughout the day but was having a spike with dinner.  This is occurring even with having very low carb meals.  He was recently started on 0.5 mg of Prandin with dinner.  This is improving his after meal blood sugars but he does note his blood sugars will drop overnight if he does not really have any carbs with dinner.  Discussed the benefit of always having some carbohydrate at each meal.  Discussed if this continues to occur to let the doctors office know so that Dr. Hayes can decrease his long-acting insulin as noted in her office visit note.  Time in range has improved to 10% since this change of adding the Prandin with dinner.  Overall eating looks good, discussed always having some carbohydrate at each meal to be able to keep things steady, 15 to 30 g of carbs.  Answers questions.  His wife has diabetes as well but has chosen to not engage in any diabetes care in terms of medication or lifestyle change and that is proving to be a challenge for him to coexist with but he is doing the best he can.  He will call with questions or for more education as needed    Ht Readings from Last 1 Encounters:   07/16/25 5' 10\" (1.778 m)     Wt Readings from Last 3 Encounters:   07/16/25 84.4 kg (186 lb)   06/26/25 84.7 kg (186 lb 12.8 oz)   04/09/24 87.1 kg (192 lb)        Body mass index is 26.69 kg/m².     Lab Results   Component Value Date    HGBA1C 7.3 (A) 06/26/2025    HGBA1C 8.3 04/19/2021    HGBA1C 8.1 (H) 01/29/2018       Lab Results   Component Value Date    CHOL 177 " "05/14/2014    CHOL 191 01/13/2014    CHOL 165 09/16/2013     Lab Results   Component Value Date    HDL 46 05/14/2014    HDL 51 01/13/2014    HDL 51 09/16/2013     No results found for: \"LDLCALC\"  Lab Results   Component Value Date    TRIG 65 05/14/2014    TRIG 65 01/13/2014    TRIG 64 09/16/2013     No results found for: \"CHOLHDL\"    Weight Change: No    Medical Diagnosis/ICD 10 Code:  E11.65    Barriers to Learning: no barriers    Do you follow any special diet presently?: Yes - watching carbs  Who shops: patient and spouse  Who cooks: patient and spouse    Food Log: Completed via the method of food recall- retired, when working was      Breakfast:up around 6am, 8am: eggs and a meat english muffin;   Morning Snack:not much, busy   Lunch:11am-1pm , salad, leftover meat, rare fruit  Afternoon Snack: not much   Dinner:before 5pm: likes to cook on the grill. He skips the potato, vegetable, avoiding carbs  Evening Snack:not much, bedtime 10ish  Beverages: water, 2 cups back coffee in the morning before breakfast, no soda, no juice, no tea, no milk, beer or whisky at night 6-7pm after dinner.   Eating out/Take out: once a week chinese buffet mostly proteins  Exercise ADL, working around all day.     Nutrition Diagnosis:  Food and nutrition related knowledge deficit  related to Lack of prior exposure to accurate nutrition related information as evidenced by Verbalizes inaccurate or incomplete information    Intervention: plate method, label reading, behavior modification strategies, carbohydrate counting, meal planning, individualized meal plan, and monitoring portion control     Treatment Goals: Patient understands education and recommendations    Education Material Given  Sean was provided the Portion Booklet and Planning Healthy Meals     Monitoring and evaluation:    Term code indicator  FH 1.6.3 Carbohydrate Intake Criteria: 15-30g CHO per meal, 0-15g CHO snacks    Patient’s Response to " Instruction:  Comprehensiongood  Motivationgood  Expected Compliancegood    Thank you for coming to the [facility] for education today.  Please feel free to call with any questions or concerns.    Janna Ortega, RD  [address]  [facility]  [place] [id number]